# Patient Record
Sex: FEMALE | Race: WHITE | NOT HISPANIC OR LATINO | Employment: UNEMPLOYED | URBAN - NONMETROPOLITAN AREA
[De-identification: names, ages, dates, MRNs, and addresses within clinical notes are randomized per-mention and may not be internally consistent; named-entity substitution may affect disease eponyms.]

---

## 2017-01-04 ENCOUNTER — OFFICE VISIT (OUTPATIENT)
Dept: PSYCHIATRY | Facility: CLINIC | Age: 37
End: 2017-01-04

## 2017-01-04 VITALS
HEART RATE: 93 BPM | SYSTOLIC BLOOD PRESSURE: 123 MMHG | HEIGHT: 66 IN | WEIGHT: 191.4 LBS | DIASTOLIC BLOOD PRESSURE: 74 MMHG | BODY MASS INDEX: 30.76 KG/M2

## 2017-01-04 DIAGNOSIS — Z79.899 LONG TERM USE OF DRUG: ICD-10-CM

## 2017-01-04 DIAGNOSIS — F40.01 PANIC DISORDER WITH AGORAPHOBIA: ICD-10-CM

## 2017-01-04 DIAGNOSIS — F33.2 MDD (MAJOR DEPRESSIVE DISORDER), RECURRENT SEVERE, WITHOUT PSYCHOSIS (HCC): ICD-10-CM

## 2017-01-04 DIAGNOSIS — F11.20 UNCOMPLICATED OPIOID DEPENDENCE (HCC): Primary | ICD-10-CM

## 2017-01-04 LAB
AMPHETAMINE CUT-OFF: ABNORMAL
BENZODIAZIPINE CUT-OFF: ABNORMAL
BUPRENORPHINE CUT-OFF: ABNORMAL
COCAINE CUT-OFF: ABNORMAL
EXTERNAL AMPHETAMINE SCREEN URINE: NEGATIVE
EXTERNAL BENZODIAZEPINE SCREEN URINE: NEGATIVE
EXTERNAL BUPRENORPHINE SCREEN URINE: POSITIVE
EXTERNAL COCAINE SCREEN URINE: NEGATIVE
EXTERNAL MDMA: NEGATIVE
EXTERNAL METHADONE SCREEN URINE: NEGATIVE
EXTERNAL METHAMPHETAMINE SCREEN URINE: NEGATIVE
EXTERNAL OPIATES SCREEN URINE: NEGATIVE
EXTERNAL OXYCODONE SCREEN URINE: NEGATIVE
EXTERNAL THC SCREEN URINE: NEGATIVE
MDMA CUT-OFF: ABNORMAL
METHADONE CUT-OFF: ABNORMAL
METHAMPHETAMINE CUT-OFF: ABNORMAL
OPIATES CUT-OFF: ABNORMAL
OXYCODONE CUT-OFF: ABNORMAL
THC CUT-OFF: ABNORMAL

## 2017-01-04 PROCEDURE — 99213 OFFICE O/P EST LOW 20 MIN: CPT | Performed by: PSYCHIATRY & NEUROLOGY

## 2017-01-04 NOTE — MR AVS SNAPSHOT
Adina Thomas   1/4/2017 9:30 AM   Office Visit    Dept Phone:  [phone]   Encounter #:  [id number]    Provider:  Miguelito Moreau MD   Department:  [facility]                Your Full Care Plan              Where to Get Your Medications      You can get these medications from any pharmacy     Bring a paper prescription for each of these medications     Buprenorphine HCl-Naloxone HCl 11.4-2.9 MG sublingual tablet            Your Updated Medication List          This list is accurate as of: 1/4/17 10:13 AM.  Always use your most recent med list.                atenolol 25 MG tablet   Commonly known as:  TENORMIN       Buprenorphine HCl-Naloxone HCl 11.4-2.9 MG sublingual tablet   Place 11.4 mg under the tongue Daily.       ibuprofen 600 MG tablet   Commonly known as:  ADVIL,MOTRIN       levothyroxine 25 MCG tablet   Commonly known as:  SYNTHROID, LEVOTHROID       omeprazole 40 MG capsule   Commonly known as:  priLOSEC       ondansetron 8 MG tablet   Commonly known as:  ZOFRAN       PARoxetine 10 MG tablet   Commonly known as:  PAXIL   Take 1 tablet by mouth Every Morning.       SUMAtriptan 25 MG tablet   Commonly known as:  IMITREX               We Performed the Following     [facility] Drug Screen       You Were Diagnosed With        Codes Comments    Uncomplicated opioid dependence    -  Primary ICD-10-CM: F11.20  ICD-9-CM: 304.00     Long term use of drug     ICD-10-CM: Z79.899  ICD-9-CM: V58.69     MDD (major depressive disorder), recurrent severe, without psychosis     ICD-10-CM: F33.2  ICD-9-CM: 296.33     Panic disorder with agoraphobia     ICD-10-CM: F40.01  ICD-9-CM: 300.21       Instructions     None    Patient Instructions History      Upcoming Appointments     Visit Type Date Time Department    MEDICINE CHECK 1/4/2017  9:30 AM [facility]    MEDICINE CHECK 2/1/2017 10:00 AM [facility]      MyChart Signup     [facility]  "[employer] allows you to send messages to your doctor, view your test results, renew your prescriptions, schedule appointments, and more. To sign up, go to [employer] and click on the Sign Up Now link in the New User? box. Enter your [employer] Activation Code exactly as it appears below along with the last four digits of your Social Security Number and your Date of Birth () to complete the sign-up process. If you do not sign up before the expiration date, you must request a new code.    [employer] Activation Code: [email or web address]  Expires: 2017 10:13 AM    If you have questions, you can email [email or web address] or call [phone] to talk to our [employer] staff. Remember, [employer] is NOT to be used for urgent needs. For medical emergencies, dial 911.               Other Info from Your Visit           Your Appointments     2017 10:00 AM EST   Medicine Check with Miguelito Moreau MD   [facility] (--)    [address] [place] [id number]   [phone]              Allergies     Codeine  GI Intolerance    Demerol [Meperidine]      Not an allergic rxn, but caused hypotension at very small dose    Morphine And Related  Rash    Nuts  Angioedema    Tongue swelling w/ pecan      Vital Signs     Blood Pressure Pulse Height Weight Body Mass Index       123/74 93 65.5\" (166.4 cm) 191 lb 6.4 oz (86.8 kg) 31.37 kg/m2       Problems and Diagnoses Noted     Mood problem    Narcotic dependence    -  Primary    Encounter for long-term (current) use of medications        Panic disorder with agoraphobia          Results     [facility] Drug Screen      Component    External Amphetamine Screen Urine    Negative    Amphetamine Cut-Off    1000ml    External Benzodiazepine Screen Urine    Negative    Benzodiazipine Cut-Off    300ml    External Cocaine Screen Urine    Negative    Cocaine Cut-Off    300ml    External THC Screen Urine    Negative    THC Cut-Off    50ml    " External Methadone Screen Urine    Negative    Methadone Cut-Off    300ml    External Methamphetamine Screen Urine    Negative    Methamphetamine Cut-Off    1000ml    External Oxycodone Screen Urine    Negative    Oxycodone Cut-Off    100ml    External Buprenorphine Screen Urine    Positive    Buprenorphine Cut-Off    10ml    External MDMA    Negative    MDMA Cut-Off    500ml    External Opiates Screen Urine    Negative    Opiates Cut-Off    300ml

## 2017-01-04 NOTE — PROGRESS NOTES
"  CC: f/u opiate dependence    HX: The patient reports doing well today.  She discussed stress over the holiday after her mother got upset with her for posting a picture on Facebook that her mother was not happy with.  Currently her mother was very hostile in her comments to Adina.  She has discussed this with family members trying to seek support from her  and her grandmother.  She denies any drug use or alcohol use since last appointment.  She continues going to NA meetings weekly.  She still going to therapy twice a month.  She reports her mood and anxiety overall been stable.  No suicidal thoughts.  Craving has also been manageable.    Craving 2/10  Depression rating 2/10  Anxiety rating 3/10  Appetite-good   Energy level-good  Sleep-fair    I have reviewed pt's allergies and current medications.   Comp care-appt: 12/8 and 12/29  SYD # [id number] reviewed, no concerns  UDS from 01/04/17 :+BUP    Review of Systems   Constitutional: Negative.    Cardiovascular: Negative.    Gastrointestinal: Negative.    Neurological: Negative.      Visit Vitals   • /74  Comment: Left Arm   • Pulse 93   • Ht 65.5\" (166.4 cm)   • Wt 191 lb 6.4 oz (86.8 kg)   • BMI 31.37 kg/m2       EXAM: Neatly, casually dressed, good hygeine. Gait and station stable. No psychomotor agitation/retardation. No motor tics Speech is normal rate, amount. Associations intact. Mood \"okay\" affect slightly anxious.. TC-goal directed.  Denies SI/HI/VH/AH. TP-linear.  Attention and concentration are fair. Memory is intact for recent and remote events. Insight-limited, judgement-limited      Encounter Diagnoses   Name Primary?   • Long term use of drug    • Uncomplicated opioid dependence Yes   • MDD (major depressive disorder), recurrent severe, without psychosis    • Panic disorder with agoraphobia        Current Outpatient Prescriptions   Medication Sig Dispense Refill   • atenolol (TENORMIN) 25 MG tablet 1/2 tab po qd     • Buprenorphine " HCl-Naloxone HCl 11.4-2.9 MG sublingual tablet Place 11.4 mg under the tongue Daily. 28 tablet 0   • ibuprofen (ADVIL,MOTRIN) 600 MG tablet Take 600 mg by mouth Every 6 (Six) Hours As Needed for mild pain (1-3).     • levothyroxine (SYNTHROID, LEVOTHROID) 25 MCG tablet Take 25 mcg by mouth Daily.     • omeprazole (PriLOSEC) 40 MG capsule Take 40 mg by mouth Daily.     • ondansetron (ZOFRAN) 8 MG tablet Take 8 mg by mouth Every 8 (Eight) Hours As Needed.     • PARoxetine (PAXIL) 10 MG tablet Take 1 tablet by mouth Every Morning. 30 tablet 2   • SUMAtriptan (IMITREX) 25 MG tablet        No current facility-administered medications for this visit.      Plan:  1.  Continue current medications  2.  Continue therapy and discussed other options if she needed since her old therapist is moving.  3.  Continue NA meetings  4.  Supportive therapy today regarding setting appropriate boundaries with family members  5.  Return to clinic one month      The risks, benefits, and side-effects were discussed with the patient.     TIME IN:9:47A  TIME OUT:10:11A

## 2017-02-01 ENCOUNTER — OFFICE VISIT (OUTPATIENT)
Dept: PSYCHIATRY | Facility: CLINIC | Age: 37
End: 2017-02-01

## 2017-02-01 VITALS
WEIGHT: 194.2 LBS | SYSTOLIC BLOOD PRESSURE: 123 MMHG | BODY MASS INDEX: 31.21 KG/M2 | DIASTOLIC BLOOD PRESSURE: 72 MMHG | HEART RATE: 95 BPM | HEIGHT: 66 IN

## 2017-02-01 DIAGNOSIS — F33.2 SEVERE EPISODE OF RECURRENT MAJOR DEPRESSIVE DISORDER, WITHOUT PSYCHOTIC FEATURES (HCC): ICD-10-CM

## 2017-02-01 DIAGNOSIS — F40.01 PANIC DISORDER WITH AGORAPHOBIA: ICD-10-CM

## 2017-02-01 DIAGNOSIS — F11.20 UNCOMPLICATED OPIOID DEPENDENCE (HCC): Primary | ICD-10-CM

## 2017-02-01 DIAGNOSIS — Z79.899 LONG TERM USE OF DRUG: ICD-10-CM

## 2017-02-01 LAB

## 2017-02-01 PROCEDURE — 99213 OFFICE O/P EST LOW 20 MIN: CPT | Performed by: PSYCHIATRY & NEUROLOGY

## 2017-02-01 RX ORDER — PAROXETINE 10 MG/1
10 TABLET, FILM COATED ORAL EVERY MORNING
Qty: 30 TABLET | Refills: 2 | Status: SHIPPED | OUTPATIENT
Start: 2017-02-01 | End: 2017-03-29 | Stop reason: SDUPTHER

## 2017-02-01 NOTE — PROGRESS NOTES
"  CC: f/u opiate dependence    HX:   The patient reports doing well today.  She reports her mood has been stable and overall there've been no major stresses in her life.  She reports craving has been very low.  She is transitioning to a new therapist after her therapist left and is waiting for an her next appointment.  She continues get NA meetings.  She denies any illicit or prescription drug use.  Still using tobacco.  No side effects from medication.  She was concerned that it took an hour and a half for zubsolv to dissolve    Last comp care appt: 1/6/17  Na meetings: 1/6,1/10, 1/12, 1/17, 1/20, 1/25, 1/30  Craving 1/10  Depression rating 2/10  Anxiety rating 3/10  Appetite-good   Energy level-fair  Sleep-fair    I have reviewed pt's allergies and current medications.     [facility] # [id number] reviewed, no concerns  UDS from 02/01/17 :+BUP    Review of Systems   Constitutional: Negative.    Cardiovascular: Negative.    Gastrointestinal: Negative.    Neurological: Negative.      Visit Vitals   • /72   • Pulse 95   • Ht 65.5\" (166.4 cm)   • Wt 194 lb 3.2 oz (88.1 kg)   • BMI 31.83 kg/m2       EXAM: Neatly, casually dressed, good hygeine. Gait and station stable. No psychomotor agitation/retardation. No motor tics Speech is normal rate, amount. Associations intact. Mood \"okay\" affect euthymic. TC-goal directed.  Denies SI/HI/VH/AH. TP-linear.  Attention and concentration are fair. Memory is intact for recent and remote events. Insight-limited, judgement-limited      Encounter Diagnoses   Name Primary?   • Long term use of drug    • Uncomplicated opioid dependence Yes   • Severe episode of recurrent major depressive disorder, without psychotic features    • Panic disorder with agoraphobia        Current Outpatient Prescriptions   Medication Sig Dispense Refill   • atenolol (TENORMIN) 25 MG tablet 1/2 tab po qd     • Buprenorphine HCl-Naloxone HCl 11.4-2.9 MG sublingual tablet Place 11.4 mg under the tongue Daily. " 28 tablet 0   • ibuprofen (ADVIL,MOTRIN) 600 MG tablet Take 600 mg by mouth Every 6 (Six) Hours As Needed for mild pain (1-3).     • levothyroxine (SYNTHROID, LEVOTHROID) 25 MCG tablet Take 25 mcg by mouth Daily.     • omeprazole (PriLOSEC) 40 MG capsule Take 40 mg by mouth Daily.     • ondansetron (ZOFRAN) 8 MG tablet Take 8 mg by mouth Every 8 (Eight) Hours As Needed.     • PARoxetine (PAXIL) 10 MG tablet Take 1 tablet by mouth Every Morning. 30 tablet 2   • SUMAtriptan (IMITREX) 25 MG tablet        No current facility-administered medications for this visit.    Plan:  1. Continue current medications  2. Continue na meetings  3. Start with a new therapist through [facility].  4. RTC 28 days.   5.  Recommended that she drink water or eat a mint prior to taking the medication as this may help dissolve it.    The risks, benefits, and side-effects were discussed with the patient.     TIME IN:10:10A  TIME OUT:10:31A

## 2017-02-27 ENCOUNTER — OFFICE VISIT (OUTPATIENT)
Dept: PSYCHIATRY | Facility: CLINIC | Age: 37
End: 2017-02-27

## 2017-02-27 VITALS
HEART RATE: 91 BPM | HEIGHT: 65 IN | DIASTOLIC BLOOD PRESSURE: 79 MMHG | WEIGHT: 193 LBS | BODY MASS INDEX: 32.15 KG/M2 | SYSTOLIC BLOOD PRESSURE: 118 MMHG

## 2017-02-27 DIAGNOSIS — F33.2 MDD (MAJOR DEPRESSIVE DISORDER), RECURRENT SEVERE, WITHOUT PSYCHOSIS (HCC): Primary | ICD-10-CM

## 2017-02-27 DIAGNOSIS — F11.20 UNCOMPLICATED OPIOID DEPENDENCE (HCC): ICD-10-CM

## 2017-02-27 PROCEDURE — 99213 OFFICE O/P EST LOW 20 MIN: CPT | Performed by: PSYCHIATRY & NEUROLOGY
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
## 2017-03-29 ENCOUNTER — OFFICE VISIT (OUTPATIENT)
Dept: PSYCHIATRY | Facility: CLINIC | Age: 37
End: 2017-03-29

## 2017-03-29 VITALS
WEIGHT: 191 LBS | DIASTOLIC BLOOD PRESSURE: 74 MMHG | SYSTOLIC BLOOD PRESSURE: 117 MMHG | HEART RATE: 89 BPM | HEIGHT: 65 IN | BODY MASS INDEX: 31.82 KG/M2

## 2017-03-29 DIAGNOSIS — Z79.899 LONG TERM USE OF DRUG: ICD-10-CM

## 2017-03-29 DIAGNOSIS — F11.20 UNCOMPLICATED OPIOID DEPENDENCE (HCC): Primary | ICD-10-CM

## 2017-03-29 DIAGNOSIS — F33.2 SEVERE EPISODE OF RECURRENT MAJOR DEPRESSIVE DISORDER, WITHOUT PSYCHOTIC FEATURES (HCC): ICD-10-CM

## 2017-03-29 PROCEDURE — 99213 OFFICE O/P EST LOW 20 MIN: CPT | Performed by: PSYCHIATRY & NEUROLOGY

## 2017-03-29 RX ORDER — PAROXETINE 10 MG/1
10 TABLET, FILM COATED ORAL EVERY MORNING
Qty: 30 TABLET | Refills: 2 | Status: SHIPPED | OUTPATIENT
Start: 2017-03-29 | End: 2017-05-03 | Stop reason: SDUPTHER

## 2017-03-29 RX ORDER — ATENOLOL 25 MG/1
12.5 TABLET ORAL DAILY
COMMUNITY

## 2017-03-29 RX ORDER — OMEPRAZOLE 20 MG/1
CAPSULE, DELAYED RELEASE ORAL DAILY
Refills: 2 | COMMUNITY
Start: 2017-03-22 | End: 2017-03-29 | Stop reason: SDUPTHER
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
## 2017-04-10 ENCOUNTER — TELEPHONE (OUTPATIENT)
Dept: PSYCHIATRY | Facility: CLINIC | Age: 37
End: 2017-04-10

## 2017-04-10 NOTE — TELEPHONE ENCOUNTER
Adina's next appt isn't until 5/3/17 and she will be short a week on her medication.  She wasn't sure if you would call it in or what she needed to do.

## 2017-04-11 NOTE — TELEPHONE ENCOUNTER
Called in Buprenorphine HCL-Naloxone 11.4-2.9mg #7 with no refills to [facility] and spoke to Noah.

## 2017-05-01 ENCOUNTER — TELEPHONE (OUTPATIENT)
Dept: PSYCHIATRY | Facility: CLINIC | Age: 37
End: 2017-05-01

## 2017-05-02 ENCOUNTER — TELEPHONE (OUTPATIENT)
Dept: PSYCHIATRY | Facility: CLINIC | Age: 37
End: 2017-05-02

## 2017-05-03 ENCOUNTER — OFFICE VISIT (OUTPATIENT)
Dept: PSYCHIATRY | Facility: CLINIC | Age: 37
End: 2017-05-03

## 2017-05-03 VITALS
BODY MASS INDEX: 30.99 KG/M2 | SYSTOLIC BLOOD PRESSURE: 120 MMHG | HEART RATE: 104 BPM | DIASTOLIC BLOOD PRESSURE: 82 MMHG | WEIGHT: 186 LBS | HEIGHT: 65 IN

## 2017-05-03 DIAGNOSIS — Z79.899 LONG TERM USE OF DRUG: ICD-10-CM

## 2017-05-03 DIAGNOSIS — F41.0 PANIC DISORDER WITHOUT AGORAPHOBIA: ICD-10-CM

## 2017-05-03 DIAGNOSIS — F33.2 SEVERE EPISODE OF RECURRENT MAJOR DEPRESSIVE DISORDER, WITHOUT PSYCHOTIC FEATURES (HCC): ICD-10-CM

## 2017-05-03 DIAGNOSIS — F11.20 UNCOMPLICATED OPIOID DEPENDENCE (HCC): Primary | ICD-10-CM

## 2017-05-03 PROCEDURE — 99213 OFFICE O/P EST LOW 20 MIN: CPT | Performed by: PSYCHIATRY & NEUROLOGY

## 2017-05-03 RX ORDER — METOCLOPRAMIDE 10 MG/1
TABLET ORAL
Refills: 2 | COMMUNITY
Start: 2017-04-26

## 2017-05-03 RX ORDER — OMEPRAZOLE 20 MG/1
CAPSULE, DELAYED RELEASE ORAL
Refills: 2 | COMMUNITY
Start: 2017-04-19 | End: 2017-05-03 | Stop reason: SDUPTHER

## 2017-05-03 RX ORDER — PAROXETINE 10 MG/1
10 TABLET, FILM COATED ORAL EVERY MORNING
Qty: 30 TABLET | Refills: 2 | Status: SHIPPED | OUTPATIENT
Start: 2017-05-03 | End: 2017-06-07 | Stop reason: SDUPTHER

## 2017-05-03 RX ORDER — ONDANSETRON 8 MG/1
TABLET, ORALLY DISINTEGRATING ORAL
Refills: 0 | COMMUNITY
Start: 2017-04-26 | End: 2017-05-03 | Stop reason: SDUPTHER

## 2017-05-03 RX ORDER — LEVOTHYROXINE SODIUM 0.05 MG/1
TABLET ORAL
Refills: 2 | COMMUNITY
Start: 2017-04-26 | End: 2018-12-26

## 2017-06-07 ENCOUNTER — OFFICE VISIT (OUTPATIENT)
Dept: PSYCHIATRY | Facility: CLINIC | Age: 37
End: 2017-06-07

## 2017-06-07 VITALS
SYSTOLIC BLOOD PRESSURE: 135 MMHG | HEART RATE: 92 BPM | BODY MASS INDEX: 31.49 KG/M2 | DIASTOLIC BLOOD PRESSURE: 81 MMHG | HEIGHT: 65 IN | WEIGHT: 189 LBS

## 2017-06-07 DIAGNOSIS — F33.2 SEVERE EPISODE OF RECURRENT MAJOR DEPRESSIVE DISORDER, WITHOUT PSYCHOTIC FEATURES (HCC): ICD-10-CM

## 2017-06-07 DIAGNOSIS — Z79.899 LONG TERM USE OF DRUG: Primary | ICD-10-CM

## 2017-06-07 DIAGNOSIS — F11.20 UNCOMPLICATED OPIOID DEPENDENCE (HCC): ICD-10-CM

## 2017-06-07 DIAGNOSIS — F40.01 PANIC DISORDER WITH AGORAPHOBIA: ICD-10-CM

## 2017-06-07 LAB
AMPHETAMINE CUT-OFF: 1000
BENZODIAZIPINE CUT-OFF: 300
BUPRENORPHINE CUT-OFF: 10
COCAINE CUT-OFF: 300
EXTERNAL AMPHETAMINE SCREEN URINE: NEGATIVE
EXTERNAL BENZODIAZEPINE SCREEN URINE: NEGATIVE
EXTERNAL BUPRENORPHINE SCREEN URINE: POSITIVE
EXTERNAL COCAINE SCREEN URINE: NEGATIVE
EXTERNAL MDMA: NEGATIVE
EXTERNAL METHADONE SCREEN URINE: NEGATIVE
EXTERNAL METHAMPHETAMINE SCREEN URINE: NEGATIVE
EXTERNAL OPIATES SCREEN URINE: NEGATIVE
EXTERNAL OXYCODONE SCREEN URINE: NEGATIVE
EXTERNAL THC SCREEN URINE: NEGATIVE
MDMA CUT-OFF: 500
METHADONE CUT-OFF: 300
METHAMPHETAMINE CUT-OFF: 1000
OPIATES CUT-OFF: 300
OXYCODONE CUT-OFF: 100
THC CUT-OFF: 50

## 2017-06-07 PROCEDURE — 99213 OFFICE O/P EST LOW 20 MIN: CPT | Performed by: PSYCHIATRY & NEUROLOGY

## 2017-06-07 RX ORDER — PAROXETINE 10 MG/1
10 TABLET, FILM COATED ORAL EVERY MORNING
Qty: 30 TABLET | Refills: 2 | Status: SHIPPED | OUTPATIENT
Start: 2017-06-07 | End: 2017-07-31 | Stop reason: SDUPTHER

## 2017-06-07 NOTE — PROGRESS NOTES
"  CC: f/u opiate dependence, depression    HX:  The patient reports overall she is doing well.  She has been stressed due to their upcoming trip to [place].  They will be gone for 2 weeks.  She denies any illicit or prescription drug use.  Craving has been low.  She has been keeping up with NA meetings and therapy.  When I ask her about her recovery plans or on vacation, she did not have anything in place but would consider going to an NA meeting there and also plans on checking in with her sponsor.  Other than the stress of getting ready for the trip, she denies significant anxiety.  Mood is been stable.    Craving 2/10  Anxiety rating 3-4/10  Appetite-good   Energy level-good,  Sleep-good    I have reviewed pt's allergies and current medications.     SYD # [id number] reviewed, no concerns  UDS from 06/07/17 :+BUP  Therapy: 5/18,5/22, 6/6  NA meetings: 5/3,5/5,5/12,5/16,5/23,5/56,5/29  Review of Systems   Constitutional: Negative.    Cardiovascular: Negative.    Gastrointestinal: Negative.    Neurological: Negative.      /81  Pulse 92  Ht 65\" (165.1 cm)  Wt 189 lb (85.7 kg)  BMI 31.45 kg/m2    EXAM: Neatly, casually dressed, good hygeine. Gait and station stable. No psychomotor agitation/retardation. No motor tics Speech is normal rate, amount. Associations intact. Mood \"pretty good\" affect euthymic. TC-goal directed. Denies SI/HI/VH/AH. TP-linear. Attention and concentration are fair. Memory is intact for recent and remote events. Insight-limited, judgement-limited      Encounter Diagnoses   Name Primary?   • Long term use of drug Yes   • Uncomplicated opioid dependence    • Severe episode of recurrent major depressive disorder, without psychotic features    • Panic disorder with agoraphobia        Current Outpatient Prescriptions   Medication Sig Dispense Refill   • atenolol (TENORMIN) 25 MG tablet Take 12.5 mg by mouth Daily.     • Buprenorphine HCl-Naloxone HCl 11.4-2.9 MG sublingual tablet Place " 11.4 mg under the tongue Daily. 28 tablet 0   • ibuprofen (ADVIL,MOTRIN) 600 MG tablet Take 600 mg by mouth Every 6 (Six) Hours As Needed for mild pain (1-3).     • levothyroxine (SYNTHROID, LEVOTHROID) 50 MCG tablet   2   • metoclopramide (REGLAN) 10 MG tablet   2   • omeprazole (PriLOSEC) 40 MG capsule Take 40 mg by mouth Daily.     • ondansetron (ZOFRAN) 8 MG tablet Take 8 mg by mouth Every 8 (Eight) Hours As Needed.     • PARoxetine (PAXIL) 10 MG tablet Take 1 tablet by mouth Every Morning. 30 tablet 2     No current facility-administered medications for this visit.      Plan:  1. Continue current medications  2. Continue therapy and NA meetings  3. RTC 4 weeks      The risks, benefits, and side-effects were discussed with the patient.     TIME IN:5342H  TIME OUT:11:23A

## 2017-07-05 ENCOUNTER — OFFICE VISIT (OUTPATIENT)
Dept: PSYCHIATRY | Facility: CLINIC | Age: 37
End: 2017-07-05

## 2017-07-05 VITALS
WEIGHT: 183 LBS | SYSTOLIC BLOOD PRESSURE: 119 MMHG | DIASTOLIC BLOOD PRESSURE: 72 MMHG | HEIGHT: 65 IN | BODY MASS INDEX: 30.49 KG/M2 | HEART RATE: 92 BPM

## 2017-07-05 DIAGNOSIS — F11.20 UNCOMPLICATED OPIOID DEPENDENCE (HCC): Primary | ICD-10-CM

## 2017-07-05 DIAGNOSIS — Z79.899 LONG TERM USE OF DRUG: ICD-10-CM

## 2017-07-05 DIAGNOSIS — F33.2 MDD (MAJOR DEPRESSIVE DISORDER), RECURRENT SEVERE, WITHOUT PSYCHOSIS (HCC): ICD-10-CM

## 2017-07-05 PROCEDURE — 99213 OFFICE O/P EST LOW 20 MIN: CPT | Performed by: PSYCHIATRY & NEUROLOGY

## 2017-07-05 NOTE — PROGRESS NOTES
"OUTPATIENT PROGRESS NOTE    CC: f/u opiate dependence    HX:  The patient reports overall doing well.  She had a vacation with her family that went well, but the  back was very stressful.  She discussed having a panic attack after having been stuck in traffic for 3 hours.  She eventually was able to return to driving after dealing with the anxiety that was associated with nausea and vomiting.  She denies other changes in her mood.  Denies any illicit drug use.  She attempted to go to an NA meeting while on vacation but got lost.  She kept in contact with her sponsor nearly daily.    Craving 1/10  Depression rating 2-3/10  Anxiety rating 2-3/10   Appetite-good   Energy level-good  Sleep-good    I have reviewed pt's allergies and current medications.     UDS from 17 :+BUP  Therapy lo/6,  last appt.  NA lo/2, , , ,     Review of Systems   HENT: Positive for dental problem.         Dental pain of her upper teeth   Respiratory: Negative.    Cardiovascular: Negative.    Gastrointestinal: Negative.    Neurological: Negative.      /72  Pulse 92  Ht 65\" (165.1 cm)  Wt 183 lb (83 kg)  BMI 30.45 kg/m2    EXAM: Neatly, casually dressed, good hygeine. Gait and station stable. No psychomotor agitation/retardation. No motor tics Speech is talkative but not pressured. Associations intact. Mood \"okay\" affect euthymic.TC-goal directed.  Denies SI/HI/VH/AH. TP-linear.  Attention and concentration are fair. Memory is intact for recent and remote events. Insight-limited, judgement- limited      Encounter Diagnoses   Name Primary?   • Long term use of drug    • Uncomplicated opioid dependence Yes   • MDD (major depressive disorder), recurrent severe, without psychosis        Current Outpatient Prescriptions   Medication Sig Dispense Refill   • atenolol (TENORMIN) 25 MG tablet Take 12.5 mg by mouth Daily.     • Buprenorphine HCl-Naloxone HCl 11.4-2.9 MG sublingual tablet Place 11.4 mg " under the tongue Daily. 28 tablet 0   • ibuprofen (ADVIL,MOTRIN) 600 MG tablet Take 600 mg by mouth Every 6 (Six) Hours As Needed for mild pain (1-3).     • levothyroxine (SYNTHROID, LEVOTHROID) 50 MCG tablet   2   • metoclopramide (REGLAN) 10 MG tablet   2   • omeprazole (PriLOSEC) 40 MG capsule Take 40 mg by mouth Daily.     • ondansetron (ZOFRAN) 8 MG tablet Take 8 mg by mouth Every 8 (Eight) Hours As Needed.     • PARoxetine (PAXIL) 10 MG tablet Take 1 tablet by mouth Every Morning. 30 tablet 2     No current facility-administered medications for this visit.    Plan:  1. Continue current medication  2. Continue therapy and NA meetings  3.  Return to clinic 28 days    The risks, benefits, and side-effects were discussed with the patient.     TIME IN:1048AM  TIME OUT:11:05AM

## 2017-07-31 ENCOUNTER — OFFICE VISIT (OUTPATIENT)
Dept: PSYCHIATRY | Facility: CLINIC | Age: 37
End: 2017-07-31

## 2017-07-31 VITALS
SYSTOLIC BLOOD PRESSURE: 117 MMHG | WEIGHT: 188 LBS | BODY MASS INDEX: 31.32 KG/M2 | HEIGHT: 65 IN | DIASTOLIC BLOOD PRESSURE: 76 MMHG | HEART RATE: 87 BPM

## 2017-07-31 DIAGNOSIS — Z79.899 MEDICATION MANAGEMENT: ICD-10-CM

## 2017-07-31 DIAGNOSIS — F41.0 PANIC DISORDER WITHOUT AGORAPHOBIA: ICD-10-CM

## 2017-07-31 DIAGNOSIS — F33.2 SEVERE EPISODE OF RECURRENT MAJOR DEPRESSIVE DISORDER, WITHOUT PSYCHOTIC FEATURES (HCC): ICD-10-CM

## 2017-07-31 DIAGNOSIS — F11.20 UNCOMPLICATED OPIOID DEPENDENCE (HCC): Primary | ICD-10-CM

## 2017-07-31 PROCEDURE — 99213 OFFICE O/P EST LOW 20 MIN: CPT | Performed by: PSYCHIATRY & NEUROLOGY

## 2017-07-31 RX ORDER — PAROXETINE 10 MG/1
10 TABLET, FILM COATED ORAL EVERY MORNING
Qty: 30 TABLET | Refills: 2 | Status: SHIPPED | OUTPATIENT
Start: 2017-07-31 | End: 2017-09-14 | Stop reason: SDUPTHER

## 2017-07-31 RX ORDER — LEVOTHYROXINE SODIUM 0.03 MG/1
TABLET ORAL
COMMUNITY
Start: 2017-07-28 | End: 2017-07-31 | Stop reason: DRUGHIGH

## 2017-08-24 DIAGNOSIS — Z79.899 LONG TERM USE OF DRUG: Primary | ICD-10-CM

## 2017-09-14 ENCOUNTER — OFFICE VISIT (OUTPATIENT)
Dept: PSYCHIATRY | Facility: CLINIC | Age: 37
End: 2017-09-14

## 2017-09-14 VITALS
DIASTOLIC BLOOD PRESSURE: 71 MMHG | BODY MASS INDEX: 31.65 KG/M2 | HEART RATE: 88 BPM | HEIGHT: 65 IN | SYSTOLIC BLOOD PRESSURE: 113 MMHG | WEIGHT: 190 LBS

## 2017-09-14 DIAGNOSIS — F33.2 MDD (MAJOR DEPRESSIVE DISORDER), RECURRENT SEVERE, WITHOUT PSYCHOSIS (HCC): Primary | ICD-10-CM

## 2017-09-14 DIAGNOSIS — F11.20 UNCOMPLICATED OPIOID DEPENDENCE (HCC): ICD-10-CM

## 2017-09-14 DIAGNOSIS — Z79.899 MEDICATION MANAGEMENT: ICD-10-CM

## 2017-09-14 DIAGNOSIS — F41.0 PANIC DISORDER: ICD-10-CM

## 2017-09-14 PROCEDURE — 99213 OFFICE O/P EST LOW 20 MIN: CPT | Performed by: PSYCHIATRY & NEUROLOGY

## 2017-09-14 RX ORDER — LEVOTHYROXINE SODIUM 0.03 MG/1
TABLET ORAL
COMMUNITY
Start: 2017-08-24 | End: 2017-10-11 | Stop reason: DRUGHIGH

## 2017-09-14 RX ORDER — OMEPRAZOLE 20 MG/1
CAPSULE, DELAYED RELEASE ORAL
COMMUNITY
Start: 2017-08-24 | End: 2017-10-11 | Stop reason: DRUGHIGH

## 2017-09-14 RX ORDER — PAROXETINE 10 MG/1
10 TABLET, FILM COATED ORAL EVERY MORNING
Qty: 30 TABLET | Refills: 2 | Status: SHIPPED | OUTPATIENT
Start: 2017-09-14 | End: 2018-01-08 | Stop reason: SDUPTHER

## 2017-09-14 NOTE — PROGRESS NOTES
"Outpatient Psychiatric Progress Note    CC: f/u opiate dependence    HX: The patient reports doing okay today.  She has been stressed with her relationship with her  and finances lately.  She has been frustrated because her therapist left and she is having to see an interim therapist and is reluctant to talk about it with her.  The patient reports craving has been low.  She denies any illicit or prescription drug use.  Anxiety has been stable.    NA meetings 8/1, 8/4,, 8/7, 8/10,8/14, 8/17, 8/21, 8/24, 8/28  Therapy: 8/3/2017  UDS +BUP only    I have reviewed pt's allergies and current medications.     Review of Systems   HENT: Positive for congestion and sinus pressure.    Respiratory: Negative.    Cardiovascular: Negative.    Gastrointestinal: Negative.    Neurological: Positive for headaches.     /71  Pulse 88  Ht 65\" (165.1 cm)  Wt 190 lb (86.2 kg)  BMI 31.62 kg/m2    EXAM: Neatly, casually dressed, good hygeine. Gait and station stable. No psychomotor agitation/retardation. No motor tics Speech is normal rate, amount. Associations intact. Mood \"good\" affect euthymic, stable.. TC-goal directed.  Denies SI/HI/VH/AH. TP-linear.  Attention and concentration are fair. Memory is intact for recent and remote events. Insight-limited judgement-limited      Encounter Diagnoses   Name Primary?   • Medication management    • MDD (major depressive disorder), recurrent severe, without psychosis Yes   • Uncomplicated opioid dependence    • Panic disorder        Current Outpatient Prescriptions   Medication Sig Dispense Refill   • atenolol (TENORMIN) 25 MG tablet Take 12.5 mg by mouth Daily.     • Buprenorphine HCl-Naloxone HCl 11.4-2.9 MG sublingual tablet Place 11.4 mg under the tongue Daily. 24 tablet 0   • ibuprofen (ADVIL,MOTRIN) 600 MG tablet Take 600 mg by mouth Every 6 (Six) Hours As Needed for mild pain (1-3).     • levothyroxine (SYNTHROID, LEVOTHROID) 25 MCG tablet      • metoclopramide (REGLAN) 10 " MG tablet   2   • omeprazole (priLOSEC) 20 MG capsule      • ondansetron (ZOFRAN) 8 MG tablet Take 8 mg by mouth Every 8 (Eight) Hours As Needed.     • PARoxetine (PAXIL) 10 MG tablet Take 1 tablet by mouth Every Morning. 30 tablet 2   • levothyroxine (SYNTHROID, LEVOTHROID) 50 MCG tablet   2   • omeprazole (PriLOSEC) 40 MG capsule Take 40 mg by mouth Daily.       No current facility-administered medications for this visit.    Plan:  1. Continue buprenorphine at current dose  2.  Continue NA meetings and therapy  3.  Continue Paxil  4.  Return to clinic 4 weeks        The risks, benefits, and treatment alternatives were discussed with the patient.     TIME IN:127PM  TIME OUT:151PM

## 2017-10-11 ENCOUNTER — OFFICE VISIT (OUTPATIENT)
Dept: PSYCHIATRY | Facility: CLINIC | Age: 37
End: 2017-10-11

## 2017-10-11 ENCOUNTER — TELEPHONE (OUTPATIENT)
Dept: PSYCHIATRY | Facility: CLINIC | Age: 37
End: 2017-10-11

## 2017-10-11 VITALS
WEIGHT: 193 LBS | SYSTOLIC BLOOD PRESSURE: 122 MMHG | DIASTOLIC BLOOD PRESSURE: 80 MMHG | HEIGHT: 65 IN | BODY MASS INDEX: 32.15 KG/M2 | HEART RATE: 94 BPM

## 2017-10-11 DIAGNOSIS — Z79.899 MEDICATION MANAGEMENT: Primary | ICD-10-CM

## 2017-10-11 DIAGNOSIS — F33.2 MDD (MAJOR DEPRESSIVE DISORDER), RECURRENT SEVERE, WITHOUT PSYCHOSIS (HCC): ICD-10-CM

## 2017-10-11 DIAGNOSIS — F41.0 PANIC DISORDER: ICD-10-CM

## 2017-10-11 DIAGNOSIS — F11.20 UNCOMPLICATED OPIOID DEPENDENCE (HCC): ICD-10-CM

## 2017-10-11 PROCEDURE — 99214 OFFICE O/P EST MOD 30 MIN: CPT | Performed by: PSYCHIATRY & NEUROLOGY

## 2017-10-11 RX ORDER — OFLOXACIN 3 MG/ML
SOLUTION AURICULAR (OTIC)
COMMUNITY
Start: 2017-10-04 | End: 2017-11-08

## 2017-10-11 RX ORDER — AZITHROMYCIN 250 MG/1
TABLET, FILM COATED ORAL
COMMUNITY
Start: 2017-10-05 | End: 2017-11-08

## 2017-10-11 NOTE — PROGRESS NOTES
"Outpatient Psychiatric Progress Note    CC: f/u opiate dependency and    HX:  Patient reports that she has not been feeling well for the past couple of weeks due to severe ear infection.  She's been on antibiotics.  She also complained of fatigue, poor concentration, transposing letters in words she is trying to say.  She is unsure what this was about however when I assessed for sleep, she is only sleeping 4-5 hours a night usually disrupted.  She will sleep for an hour, wake up, then get on her phone, then return to sleep.  The patient denies any panic symptoms.  Denies feeling depressed or suicidal.  For substance use, she denies any craving.  She has been compliant with NA meetings in therapy, though she is scheduled to begin with the new therapist at [facility]  Anxiety rating 4/10   Sleep-restless, 4-5 total night  UDS: pending   SYD# [id number]  NA meetings:9/16, 9/19, 9/22m 9/25, 9/29, 10/3, 10/6, 10/9   Therapy: 9/15/17  I have reviewed pt's allergies and current medications.     Review of Systems   Constitutional: Positive for fatigue.   HENT: Positive for ear pain.    Respiratory: Negative.    Cardiovascular: Negative.    Gastrointestinal: Negative.    Genitourinary: Negative.    Neurological: Positive for speech difficulty and headaches.   Psychiatric/Behavioral: Negative.      /80  Pulse 94  Ht 65\" (165.1 cm)  Wt 193 lb (87.5 kg)  BMI 32.12 kg/m2    EXAM: Neatly, casually dressed, good hygeine. Gait and station stable. No psychomotor agitation/retardation. No motor tics Speech is normal rate, amount. Associations intact. Mood \"all right\" affect euthymic, stable.. TC-goal directed.  Denies SI/HI/VH/AH. TP-linear.  Attention and concentration are fair. Memory is intact for recent and remote events. Insight-fair, judgement-fair      Encounter Diagnoses   Name Primary?   • Medication management Yes   • MDD (major depressive disorder), recurrent severe, without psychosis    • Uncomplicated " opioid dependence        Current Outpatient Prescriptions   Medication Sig Dispense Refill   • atenolol (TENORMIN) 25 MG tablet Take 12.5 mg by mouth Daily.     • azithromycin (ZITHROMAX) 250 MG tablet      • Buprenorphine HCl-Naloxone HCl 11.4-2.9 MG sublingual tablet Place 11.4 mg under the tongue Daily. 28 tablet 0   • ibuprofen (ADVIL,MOTRIN) 600 MG tablet Take 600 mg by mouth Every 6 (Six) Hours As Needed for mild pain (1-3).     • levothyroxine (SYNTHROID, LEVOTHROID) 50 MCG tablet   2   • metoclopramide (REGLAN) 10 MG tablet   2   • ofloxacin (FLOXIN) 0.3 % otic solution      • omeprazole (PriLOSEC) 40 MG capsule Take 40 mg by mouth Daily.     • ondansetron (ZOFRAN) 8 MG tablet Take 8 mg by mouth Every 8 (Eight) Hours As Needed.     • PARoxetine (PAXIL) 10 MG tablet Take 1 tablet by mouth Every Morning. 30 tablet 2     No current facility-administered medications for this visit.    Plan:  1. Continue buprenorphine for opiate dependency  2.  Continue Paxil for panic  3.  Reviewed sleep hygiene concerns and also discussed other psychotherapeutic techniques such as guided imagery to help with anxious thoughts at night  4.  Return to clinic 28 days  5. F/u UDS today.         The risks, benefits, and treatment alternatives were discussed with the patient.     TIME IN:1015PM   TIME OUT:1043PM

## 2017-11-06 NOTE — TELEPHONE ENCOUNTER
Pt is coming in Thursday to drug  test but today is her last dose of Buprenorphine- Naloxone 11.4-2.9 sublingual tablets. Can you send in enough to do her until Thursday?

## 2017-11-08 ENCOUNTER — OFFICE VISIT (OUTPATIENT)
Dept: PSYCHIATRY | Facility: CLINIC | Age: 37
End: 2017-11-08

## 2017-11-08 VITALS
SYSTOLIC BLOOD PRESSURE: 133 MMHG | DIASTOLIC BLOOD PRESSURE: 83 MMHG | HEART RATE: 88 BPM | HEIGHT: 65 IN | WEIGHT: 188 LBS | BODY MASS INDEX: 31.32 KG/M2

## 2017-11-08 DIAGNOSIS — F41.0 PANIC DISORDER: ICD-10-CM

## 2017-11-08 DIAGNOSIS — Z79.899 MEDICATION MANAGEMENT: ICD-10-CM

## 2017-11-08 DIAGNOSIS — F11.20 UNCOMPLICATED OPIOID DEPENDENCE (HCC): Primary | ICD-10-CM

## 2017-11-08 PROCEDURE — 99213 OFFICE O/P EST LOW 20 MIN: CPT | Performed by: PSYCHIATRY & NEUROLOGY

## 2017-11-08 NOTE — PROGRESS NOTES
"OUTPATIENT PSYCHIATRIC PROGRESS NOTE    CC: f/u opioid dependence, panic disorder    HX:    She was seen for follow-up for opiate dependency.  Overall, she reports things are going well.  No illicit or prescription drug use.  She initially thought she would miss this appointment due to her grandmother's appointment in [place]; however her sister was able to take her grandmother to the appointment.  Adina discussed that her grandmother is very upset with her because of this.  Adina acknowledges that her grandmother can be demanding and entitled.  The patient otherwise reports her mood has been good.  No panic attacks.  Craving 2/10  Appetite-fair, Sleep-fair    I have reviewed pt's allergies and current medications.     [facility] # [id number] reviewed, no concerns  UDS from 11/08/17 : +BUP only  [facility] therapy: 10/18, 11/3  NA meetings: 10/12, 10/17, 10/20, 10/17, 10/24, 10/30, 11/6    Review of Systems   Constitutional: Negative.    Cardiovascular: Negative.    Gastrointestinal: Negative.    Neurological: Negative.      /83  Pulse 88  Ht 65\" (165.1 cm)  Wt 188 lb (85.3 kg)  BMI 31.28 kg/m2    EXAM: Neatly, casually dressed, good hygeine. Gait and station stable. No psychomotor agitation/retardation. No motor tics Speech is normal rate, amount. Associations intact. Mood \"good\" affect euthymic, stable. TC-goal directed.  Denies SI/HI/VH/AH. TP-linear.  Attention and concentration are fair. Memory is intact for recent and remote events. Insight-limited, judgement-limited      Encounter Diagnoses   Name Primary?   • Medication management    • Uncomplicated opioid dependence Yes   • Panic disorder        Current Outpatient Prescriptions   Medication Sig Dispense Refill   • atenolol (TENORMIN) 25 MG tablet Take 12.5 mg by mouth Daily.     • Buprenorphine HCl-Naloxone HCl 11.4-2.9 MG sublingual tablet Place 11.4 mg under the tongue Daily. 29 tablet 0   • CALTRATE 600+D 600-800 MG-UNIT tablet      • ibuprofen " (ADVIL,MOTRIN) 600 MG tablet Take 600 mg by mouth Every 6 (Six) Hours As Needed for mild pain (1-3).     • levothyroxine (SYNTHROID, LEVOTHROID) 50 MCG tablet   2   • metoclopramide (REGLAN) 10 MG tablet   2   • omeprazole (PriLOSEC) 40 MG capsule Take 40 mg by mouth Daily.     • ondansetron (ZOFRAN) 8 MG tablet Take 8 mg by mouth Every 8 (Eight) Hours As Needed.     • PARoxetine (PAXIL) 10 MG tablet Take 1 tablet by mouth Every Morning. 30 tablet 2     No current facility-administered medications for this visit.    Plan:  1. Continue zubzolv at current dose  2. Continue paxil for panic disorder  3. RTC 28 days      The risks, benefits, and side-effects were discussed with the patient.     TIME IN:1006AM  TIME OUT:1030AM

## 2017-12-04 DIAGNOSIS — Z79.899 MEDICATION MANAGEMENT: Primary | ICD-10-CM

## 2017-12-19 ENCOUNTER — TELEPHONE (OUTPATIENT)
Dept: PSYCHIATRY | Facility: CLINIC | Age: 37
End: 2017-12-19

## 2017-12-19 NOTE — TELEPHONE ENCOUNTER
Patient apt is not until 1/08 patient will be out of meds on 1/1 her next apt is not until 1/8 does she need to come in early or refills?

## 2017-12-20 NOTE — TELEPHONE ENCOUNTER
I called in Buprenorphine HCl-Naloxone HCl 11.4-2.9 MG sublingual tablet   1 week supply (#7) to [facility] on 12/20/17 @ 9:31

## 2018-01-08 ENCOUNTER — OFFICE VISIT (OUTPATIENT)
Dept: PSYCHIATRY | Facility: CLINIC | Age: 38
End: 2018-01-08

## 2018-01-08 VITALS
HEART RATE: 91 BPM | DIASTOLIC BLOOD PRESSURE: 78 MMHG | BODY MASS INDEX: 30.66 KG/M2 | SYSTOLIC BLOOD PRESSURE: 128 MMHG | WEIGHT: 184 LBS | HEIGHT: 65 IN

## 2018-01-08 DIAGNOSIS — Z79.899 MEDICATION MANAGEMENT: ICD-10-CM

## 2018-01-08 DIAGNOSIS — F11.20 UNCOMPLICATED OPIOID DEPENDENCE (HCC): Primary | ICD-10-CM

## 2018-01-08 DIAGNOSIS — F33.2 MDD (MAJOR DEPRESSIVE DISORDER), RECURRENT SEVERE, WITHOUT PSYCHOSIS (HCC): ICD-10-CM

## 2018-01-08 DIAGNOSIS — F41.0 PANIC DISORDER: ICD-10-CM

## 2018-01-08 PROCEDURE — 99214 OFFICE O/P EST MOD 30 MIN: CPT | Performed by: PSYCHIATRY & NEUROLOGY

## 2018-01-08 RX ORDER — MIRTAZAPINE 15 MG/1
15 TABLET, FILM COATED ORAL NIGHTLY PRN
Qty: 30 TABLET | Refills: 0 | Status: SHIPPED | OUTPATIENT
Start: 2018-01-08 | End: 2018-02-28

## 2018-01-08 RX ORDER — PAROXETINE 10 MG/1
10 TABLET, FILM COATED ORAL EVERY MORNING
Qty: 30 TABLET | Refills: 2 | Status: SHIPPED | OUTPATIENT
Start: 2018-01-08 | End: 2018-03-26 | Stop reason: SDUPTHER

## 2018-01-08 RX ORDER — ERGOCALCIFEROL 1.25 MG/1
50000 CAPSULE ORAL
COMMUNITY

## 2018-01-08 NOTE — PROGRESS NOTES
"Outpatient Psychiatric Progress Note    CC: f/u opioid dependence, major depression and panic disorder    HX: Adina was seen for follow-up today.  She reports overall, things have been going well.  During the holiday she had conflict with family but denies panic attacks and denies relapse.  She denies any illicit or prescription drug use.  Craving most days has been 2-3 out of 10 for opiates.  She reports her mood has been stable but sleep has been poor and her appetite is decreased.  She reports food does not seem to taste the same as it used to.  She denies feeling sick otherwise, but plans on seeing her PCP later today to discuss.  She also has a sleep study scheduled in the next month or 2.      Depression rating 2-3/10  Anxiety rating 2/10  Appetite-decreased   Energy level-fair to low  Sleep-poor    Updated social history: The patient has been encouraged to apply for a peer support position at [facility].  Her therapist has encouraged her to apply.    I have reviewed pt's allergies and current medications.     Review of Systems   Constitutional: Positive for appetite change and fatigue.   Respiratory: Negative.    Cardiovascular: Negative.    Gastrointestinal: Negative.    Neurological: Positive for headaches.   Psychiatric/Behavioral: Positive for sleep disturbance. Negative for suicidal ideas.     /78  Pulse 91  Ht 165 cm (64.96\")  Wt 83.5 kg (184 lb)  BMI 30.66 kg/m2    EXAM: Neatly, casually dressed, good hygeine. Gait and station stable. No psychomotor agitation/retardation. No motor tics Speech is normal rate, amount. Associations intact. Mood \"okay\" affect euthymic, stable.. TC-goal directed.  Denies SI/HI/VH/AH. TP-linear.  Attention and concentration are fair. Memory is intact for recent and remote events. Insight-limited, judgement-limited      Encounter Diagnoses   Name Primary?   • Medication management    • MDD (major depressive disorder), recurrent severe, without psychosis  "   • Uncomplicated opioid dependence Yes   • Panic disorder        Current Outpatient Prescriptions   Medication Sig Dispense Refill   • atenolol (TENORMIN) 25 MG tablet Take 12.5 mg by mouth Daily.     • Buprenorphine HCl-Naloxone HCl 11.4-2.9 MG sublingual tablet Place 11.4 mg under the tongue Daily. 28 tablet 0   • ibuprofen (ADVIL,MOTRIN) 600 MG tablet Take 600 mg by mouth Every 6 (Six) Hours As Needed for mild pain (1-3).     • levothyroxine (SYNTHROID, LEVOTHROID) 50 MCG tablet   2   • metoclopramide (REGLAN) 10 MG tablet   2   • omeprazole (PriLOSEC) 40 MG capsule Take 40 mg by mouth Daily.     • ondansetron (ZOFRAN) 8 MG tablet Take 8 mg by mouth Every 8 (Eight) Hours As Needed.     • PARoxetine (PAXIL) 10 MG tablet Take 1 tablet by mouth Every Morning. 30 tablet 2   • vitamin D (ERGOCALCIFEROL) 46453 units capsule capsule Take 50,000 Units by mouth Every 7 (Seven) Days.     • CALTRATE 600+D 600-800 MG-UNIT tablet      • mirtazapine (REMERON) 15 MG tablet Take 1 tablet by mouth At Night As Needed (insomnia). 30 tablet 0     No current facility-administered medications for this visit.    Plan:  1. Continue zubsolve for opiate dependency  2.  Continue Paxil for panic disorder  3.  Restart Remeron 7.5-50 mg nightly as needed for sleep  4.  Continue therapy at [facility].  If the patient presented as this, we will have to discuss getting therapy through another organization if possible.  5.  Return to clinic 28 days          TIME IN:951  TIME OUT:1020A

## 2018-02-02 ENCOUNTER — OFFICE VISIT (OUTPATIENT)
Dept: PSYCHIATRY | Facility: CLINIC | Age: 38
End: 2018-02-02

## 2018-02-02 VITALS
BODY MASS INDEX: 30.66 KG/M2 | DIASTOLIC BLOOD PRESSURE: 80 MMHG | HEIGHT: 65 IN | HEART RATE: 99 BPM | WEIGHT: 184 LBS | SYSTOLIC BLOOD PRESSURE: 132 MMHG

## 2018-02-02 DIAGNOSIS — Z79.899 MEDICATION MANAGEMENT: Primary | ICD-10-CM

## 2018-02-02 LAB
AMPHETAMINE CUT-OFF: 1000
BENZODIAZIPINE CUT-OFF: 300
BUPRENORPHINE CUT-OFF: 10
COCAINE CUT-OFF: 300
EXTERNAL AMPHETAMINE SCREEN URINE: NEGATIVE
EXTERNAL BENZODIAZEPINE SCREEN URINE: NEGATIVE
EXTERNAL BUPRENORPHINE SCREEN URINE: NEGATIVE
EXTERNAL COCAINE SCREEN URINE: NEGATIVE
EXTERNAL MDMA: NEGATIVE
EXTERNAL METHADONE SCREEN URINE: NEGATIVE
EXTERNAL METHAMPHETAMINE SCREEN URINE: POSITIVE
EXTERNAL OPIATES SCREEN URINE: NEGATIVE
EXTERNAL OXYCODONE SCREEN URINE: NEGATIVE
EXTERNAL THC SCREEN URINE: NEGATIVE
MDMA CUT-OFF: 500
METHADONE CUT-OFF: 300
METHAMPHETAMINE CUT-OFF: 1000
OPIATES CUT-OFF: 300
OXYCODONE CUT-OFF: 100
THC CUT-OFF: 50

## 2018-02-02 RX ORDER — MECLIZINE HCL 25 MG/1
TABLET, CHEWABLE ORAL AS NEEDED
Refills: 0 | COMMUNITY
Start: 2018-01-26 | End: 2019-09-17

## 2018-02-28 ENCOUNTER — OFFICE VISIT (OUTPATIENT)
Dept: PSYCHIATRY | Facility: CLINIC | Age: 38
End: 2018-02-28

## 2018-02-28 VITALS
DIASTOLIC BLOOD PRESSURE: 79 MMHG | HEART RATE: 91 BPM | BODY MASS INDEX: 29.82 KG/M2 | HEIGHT: 65 IN | SYSTOLIC BLOOD PRESSURE: 122 MMHG | WEIGHT: 179 LBS

## 2018-02-28 DIAGNOSIS — F33.2 MDD (MAJOR DEPRESSIVE DISORDER), RECURRENT SEVERE, WITHOUT PSYCHOSIS (HCC): ICD-10-CM

## 2018-02-28 DIAGNOSIS — F41.0 PANIC DISORDER: ICD-10-CM

## 2018-02-28 DIAGNOSIS — F11.20 UNCOMPLICATED OPIOID DEPENDENCE (HCC): Primary | ICD-10-CM

## 2018-02-28 DIAGNOSIS — Z79.899 MEDICATION MANAGEMENT: ICD-10-CM

## 2018-02-28 PROCEDURE — 99214 OFFICE O/P EST MOD 30 MIN: CPT | Performed by: PSYCHIATRY & NEUROLOGY

## 2018-02-28 NOTE — PROGRESS NOTES
"Outpatient Psychiatric Progress Note    CC: f/u opioid dependence, major depression and panic disorder    HX: Adina was seen for follow-up today.  The patient reports that she's been very stressed lately due to multiple issues including her grandmother's health issues.  They are unsure what is going on and her family is planning on taking her to the [place] in the next couple of days.  The patient herself also has some medical issues that she is anxious about including an area of skin cancer and also experiencing vertigo.  Her PCP diagnosed benign positional vertigo.  The patient also notes that her blood pressure has stayed to low to take Remeron at nighttime.  She was instructed to still take atenolol.  She finds herself getting tired with low blood pressure in the afternoons.  I reviewed her medications and the patient is consistently taking Reglan about 3-4 due to acid reflux.  She reports that she also finds other issues with talking at times and her tongue will not allow her to form the word she is wanting.      Depression rating 2-3/10  Anxiety rating 3-4/10  Appetite-decreased   Energy level-fair to low  Sleep-poor  1/5,1/24, 2/24 2/2,2/6,2/9,2/13,2/16,2/20,2/23    I have reviewed pt's allergies and current medications.     Review of Systems   Constitutional: Negative for appetite change and fatigue.   HENT: Positive for dental problem.    Respiratory: Positive for shortness of breath.    Cardiovascular: Positive for palpitations.   Gastrointestinal: Negative.    Musculoskeletal: Negative.    Neurological: Positive for dizziness. Negative for headaches.   Psychiatric/Behavioral: Negative for dysphoric mood, sleep disturbance and suicidal ideas.     /79  Pulse 91  Ht 165 cm (64.96\")  Wt 81.2 kg (179 lb)  BMI 29.82 kg/m2    MENTAL STATUS EXAM:  Appearance:Neatly, casually dressed, good hygeine.   Cooperation:Cooperative  Orientation: Ox4  Gait and station stable.   Psychomotor: No " "psychomotor agitation/retardation, No EPS, No motor tics  Speech-normal rate, amount.  Mood \"I'm okay--I'm alright\"   Affect-congruent/appropriate.  Thought Content-goal directed, no delusional material present  Thought process-linear, organized.  Suicidality: No SI  Homicidality: No HI  Perception: No AH/VH  Memory is intact for recent and remote events  Concentration: good  Impulse control-good  Insight- limited  Judgement- limited        Encounter Diagnoses   Name Primary?   • Medication management    • MDD (major depressive disorder), recurrent severe, without psychosis    • Uncomplicated opioid dependence Yes   • Panic disorder        Current Outpatient Prescriptions   Medication Sig Dispense Refill   • atenolol (TENORMIN) 25 MG tablet Take 12.5 mg by mouth Daily.     • Buprenorphine HCl-Naloxone HCl 11.4-2.9 MG sublingual tablet Place 11.4 mg under the tongue Daily. 28 tablet 0   • CALTRATE 600+D 600-800 MG-UNIT tablet      • ibuprofen (ADVIL,MOTRIN) 600 MG tablet Take 600 mg by mouth Every 6 (Six) Hours As Needed for mild pain (1-3).     • levothyroxine (SYNTHROID, LEVOTHROID) 50 MCG tablet   2   • metoclopramide (REGLAN) 10 MG tablet   2   • omeprazole (PriLOSEC) 40 MG capsule Take 40 mg by mouth Daily.     • ondansetron (ZOFRAN) 8 MG tablet Take 8 mg by mouth Every 8 (Eight) Hours As Needed.     • PARoxetine (PAXIL) 10 MG tablet Take 1 tablet by mouth Every Morning. 30 tablet 2   • TRAVEL SICKNESS 25 MG chewable tablet chewable tablet As Needed.  0   • vitamin D (ERGOCALCIFEROL) 78078 units capsule capsule Take 50,000 Units by mouth Every 7 (Seven) Days.       No current facility-administered medications for this visit.    Plan:  1. Continue zubsolve for opiate dependency  2.  Continue Paxil for panic disorder  3.  [facility]   4.  Return to clinic 28 days  5. I also recommended that she stop Reglan due to concern for possible EPS as well as hypertension and if she continues to " have GI symptoms to requested GI referral from her PCP    TIME IN:952  TIME OUT:4335F

## 2018-03-07 ENCOUNTER — TELEPHONE (OUTPATIENT)
Dept: PSYCHIATRY | Facility: CLINIC | Age: 38
End: 2018-03-07

## 2018-03-07 NOTE — TELEPHONE ENCOUNTER
Patient called stating she forgot to ask you the other day that she is going to be having oral surgery and will be put to sleep and was concern about it interfering with her Zubsolv states that mention about giving her Nitrous Oxide. Please advise

## 2018-03-20 ENCOUNTER — TELEPHONE (OUTPATIENT)
Dept: PSYCHIATRY | Facility: CLINIC | Age: 38
End: 2018-03-20

## 2018-03-20 NOTE — TELEPHONE ENCOUNTER
Adina is wondering if you could see her on Monday 3/26. She is taking a family member to get a stress test who is in Congestive heart failure and will not be able to make her appointment on 3/28. She says that Wednesdays are hard on her and said you mentioned seeing her on Mondays from here on out? Please advise.     Patient is calling back 3/21 for a response. Please advise.

## 2018-03-26 ENCOUNTER — OFFICE VISIT (OUTPATIENT)
Dept: PSYCHIATRY | Facility: CLINIC | Age: 38
End: 2018-03-26

## 2018-03-26 VITALS
WEIGHT: 178 LBS | HEART RATE: 94 BPM | BODY MASS INDEX: 29.66 KG/M2 | HEIGHT: 65 IN | SYSTOLIC BLOOD PRESSURE: 118 MMHG | DIASTOLIC BLOOD PRESSURE: 71 MMHG

## 2018-03-26 DIAGNOSIS — F41.0 PANIC DISORDER: ICD-10-CM

## 2018-03-26 DIAGNOSIS — F33.1 MODERATE EPISODE OF RECURRENT MAJOR DEPRESSIVE DISORDER (HCC): ICD-10-CM

## 2018-03-26 DIAGNOSIS — Z79.899 MEDICATION MANAGEMENT: ICD-10-CM

## 2018-03-26 DIAGNOSIS — F11.20 UNCOMPLICATED OPIOID DEPENDENCE (HCC): Primary | ICD-10-CM

## 2018-03-26 PROCEDURE — 99214 OFFICE O/P EST MOD 30 MIN: CPT | Performed by: PSYCHIATRY & NEUROLOGY

## 2018-03-26 RX ORDER — PAROXETINE 10 MG/1
10 TABLET, FILM COATED ORAL EVERY MORNING
Qty: 30 TABLET | Refills: 2 | Status: SHIPPED | OUTPATIENT
Start: 2018-03-26 | End: 2018-04-30 | Stop reason: SDUPTHER

## 2018-03-26 NOTE — PROGRESS NOTES
Outpatient Psychiatric Progress Note    CC: Follow-up for opioid dependence, depression, panic disorder    HX: She was seen for follow-up for opioid dependence and depression.  She reports craving continues to be stable at a 2-3 out of 10.  She denies any illicit or prescription drug use.  She continues to get a meetings and therapy.  She reports her mood has been stable but has been stressed by several family issues including her grandmother's physical illness which is still undiagnosed, a cancerous lesion on her own face, and her relationship with her daughter.  No panic attacks since last visit.    Updated social history:   The patient's daughter has been spending less time with her.  She believes this may be because she has tried to set limits particularly with her phone and the patient's daughter subsequently has preferred to stay at her grandmother's.     NA Meetings 3/12, 3/15, 3/19, 3/22, 3/24  Therapy appt 3/23/18    I have reviewed pt's allergies and current medications.   Current Outpatient Prescriptions   Medication Sig Dispense Refill   • atenolol (TENORMIN) 25 MG tablet Take 12.5 mg by mouth Daily.     • Buprenorphine HCl-Naloxone HCl 11.4-2.9 MG sublingual tablet Place 11.4 mg under the tongue Daily. 28 tablet 0   • CALTRATE 600+D 600-800 MG-UNIT tablet      • ibuprofen (ADVIL,MOTRIN) 600 MG tablet Take 600 mg by mouth Every 6 (Six) Hours As Needed for mild pain (1-3).     • levothyroxine (SYNTHROID, LEVOTHROID) 50 MCG tablet   2   • metoclopramide (REGLAN) 10 MG tablet   2   • omeprazole (PriLOSEC) 40 MG capsule Take 40 mg by mouth Daily.     • ondansetron (ZOFRAN) 8 MG tablet Take 8 mg by mouth Every 8 (Eight) Hours As Needed.     • PARoxetine (PAXIL) 10 MG tablet Take 1 tablet by mouth Every Morning. 30 tablet 2   • TRAVEL SICKNESS 25 MG chewable tablet chewable tablet As Needed.  0   • vitamin D (ERGOCALCIFEROL) 22261 units capsule capsule Take 50,000 Units by mouth Every 7 (Seven) Days.       No  "current facility-administered medications for this visit.        Allergies   Allergen Reactions   • Codeine GI Intolerance   • Demerol [Meperidine]      Not an allergic rxn, but caused hypotension at very small dose   • Prednisone Unknown (See Comments)     unknown   • Morphine And Related Rash   • Nuts Angioedema     Tongue swelling w/ pecan     Review of Systems   Constitutional: Negative.    Respiratory: Negative.    Cardiovascular: Negative.    Gastrointestinal: Negative.    Genitourinary: Negative.    Musculoskeletal: Negative.    Neurological: Positive for headaches.   Psychiatric/Behavioral: The patient is nervous/anxious.      /71   Pulse 94   Ht 165 cm (64.96\")   Wt 80.7 kg (178 lb)   BMI 29.66 kg/m²     MENTAL STATUS EXAM:  Appearance:Neatly, casually dressed, good hygeine.   Cooperation:Cooperative  Orientation: Ox4  Gait and station stable.   Psychomotor: No psychomotor agitation/retardation, No EPS, No motor tics  Speech-normal rate, amount.  Mood \"stressed\"   Affect- euthymic stable  Thought Content-goal directed, no delusional material present  Thought process-linear, organized.  Suicidality: No SI  Homicidality: No HI  Perception: No AH/VH  Memory is intact for recent and remote events  Concentration: good  Impulse control-good  Insight-fair   Judgement-fair    ASSESSMENT AND PLAN  Encounter Diagnoses   Name Primary?   • Medication management    • Uncomplicated opioid dependence Yes   • Moderate episode of recurrent major depressive disorder    • Panic disorder        Plan:  1.  Continue buprenorphine at current dose  2.  Continue Paxil at current dose for depression and panic disorder  3.  Supportive therapy today in her approach to her daughter and her grandmother.  I encouraged her to continue working on this in therapy with her therapist.  4.  Continue NA meetings and therapy at [facility] in [place]    TIME IN:1020  TIME OUT:0317    Miguelito Moreau MD  10:22 AM  "

## 2018-04-16 ENCOUNTER — TELEPHONE (OUTPATIENT)
Dept: PSYCHIATRY | Facility: CLINIC | Age: 38
End: 2018-04-16

## 2018-04-16 NOTE — TELEPHONE ENCOUNTER
Adina called and stated that she has an appt on 4/23/18 to have a biopsy done on her thyroid.  The doctor said he would prescribe her #1  Xanax 0.5mg for anxiety.  She wanted to make sure it was ok with you before taking it. The 23 rd is also her appt date here and I'm going to have to reschedule it. When would be the best time to r/s her appointment.  Please advise

## 2018-04-19 ENCOUNTER — PRIOR AUTHORIZATION (OUTPATIENT)
Dept: PSYCHIATRY | Facility: CLINIC | Age: 38
End: 2018-04-19

## 2018-04-19 NOTE — TELEPHONE ENCOUNTER
Patient has been informed her Zubsolv has been approved, I also contacted the pharmacy to make sure it would go through which it did.

## 2018-04-30 ENCOUNTER — OFFICE VISIT (OUTPATIENT)
Dept: PSYCHIATRY | Facility: CLINIC | Age: 38
End: 2018-04-30

## 2018-04-30 VITALS
BODY MASS INDEX: 28.99 KG/M2 | SYSTOLIC BLOOD PRESSURE: 121 MMHG | DIASTOLIC BLOOD PRESSURE: 69 MMHG | WEIGHT: 174 LBS | HEIGHT: 65 IN | HEART RATE: 89 BPM

## 2018-04-30 DIAGNOSIS — F33.1 MODERATE EPISODE OF RECURRENT MAJOR DEPRESSIVE DISORDER (HCC): ICD-10-CM

## 2018-04-30 DIAGNOSIS — Z79.899 MEDICATION MANAGEMENT: ICD-10-CM

## 2018-04-30 DIAGNOSIS — F11.20 UNCOMPLICATED OPIOID DEPENDENCE (HCC): Primary | ICD-10-CM

## 2018-04-30 DIAGNOSIS — F41.0 PANIC DISORDER: ICD-10-CM

## 2018-04-30 PROCEDURE — 99213 OFFICE O/P EST LOW 20 MIN: CPT | Performed by: PSYCHIATRY & NEUROLOGY

## 2018-04-30 RX ORDER — METOCLOPRAMIDE 5 MG/1
TABLET ORAL
COMMUNITY
Start: 2018-04-23 | End: 2018-04-30 | Stop reason: DRUGHIGH

## 2018-04-30 RX ORDER — PAROXETINE 10 MG/1
10 TABLET, FILM COATED ORAL EVERY MORNING
Qty: 30 TABLET | Refills: 2 | Status: SHIPPED | OUTPATIENT
Start: 2018-04-30 | End: 2018-08-13 | Stop reason: SDUPTHER

## 2018-05-22 ENCOUNTER — TELEPHONE (OUTPATIENT)
Dept: PSYCHIATRY | Facility: CLINIC | Age: 38
End: 2018-05-22

## 2018-05-22 NOTE — TELEPHONE ENCOUNTER
Patient did not get her next appointment until 07-02-18.  She will be leaving for vacation for 2 weeks.  Can you refill her Subutex for a quanity of 36?

## 2018-06-14 ENCOUNTER — TELEPHONE (OUTPATIENT)
Dept: PSYCHIATRY | Facility: CLINIC | Age: 38
End: 2018-06-14

## 2018-06-14 NOTE — TELEPHONE ENCOUNTER
Patient called the office stating that she is leaving for [place] Saturday for 2 weeks and that she wasn't for sure if it happened on her end or the Pharmacy side but she is going be 1 short on her Burprenorphine her next laura isn't till July 2nd . Please Advise

## 2018-06-14 NOTE — TELEPHONE ENCOUNTER
I sent in 3 additional tablets and told the pharmacy they could fill it early; however, he pharmacy may not be able to do this.  Otherwise she will have to manage with her current prescription.

## 2018-07-02 ENCOUNTER — OFFICE VISIT (OUTPATIENT)
Dept: PSYCHIATRY | Facility: CLINIC | Age: 38
End: 2018-07-02

## 2018-07-02 VITALS
BODY MASS INDEX: 28.66 KG/M2 | WEIGHT: 172 LBS | HEART RATE: 94 BPM | DIASTOLIC BLOOD PRESSURE: 80 MMHG | SYSTOLIC BLOOD PRESSURE: 126 MMHG | HEIGHT: 65 IN

## 2018-07-02 DIAGNOSIS — F11.20 UNCOMPLICATED OPIOID DEPENDENCE (HCC): ICD-10-CM

## 2018-07-02 DIAGNOSIS — F19.10 SUBSTANCE ABUSE (HCC): ICD-10-CM

## 2018-07-02 DIAGNOSIS — Z79.899 MEDICATION MANAGEMENT: Primary | ICD-10-CM

## 2018-07-02 DIAGNOSIS — F33.1 MODERATE EPISODE OF RECURRENT MAJOR DEPRESSIVE DISORDER (HCC): ICD-10-CM

## 2018-07-02 PROCEDURE — 99214 OFFICE O/P EST MOD 30 MIN: CPT | Performed by: NURSE PRACTITIONER

## 2018-07-02 NOTE — PROGRESS NOTES
"      Subjective   Adina Thomas is a 38 y.o. female is here today for medication management follow-up.    Chief Complaint:  \"Im doing OK\"    History of Present Illness:  Pt is her for medication follow up.  Dr Moreau is helping perform this interview.  Patient's care is being transferred from Dr. Moreau to myself.  Patient is doing well.  She rates her anxiety at 2-3 out of 10 on a 1-10 scale with 10 being worst.  She denies any depression.  Denies any suicidal thoughts, homicidal thoughts, or any AV hallucinations.  She is not having any panic attacks.  She is craving on a 2-3 level out of 10 with 10 being worse as well.  She states the current dose is adequate.  She is still having some trouble sleeping restlessly but is dealing with it.  She did have a recent sleep study which was negative for apnea.  She denies any current substance abuse denies any alcohol use and continues to use the cigarettes.  She has a narcotic anonymous meeting log which shows meetings consistently that she has attended.  A copy of this has been placed in the chart.  She is also continuing with therapy at [facility] with the most recent appointment May 1.  There have been several counselor changes there and she still desires to stay there as it is close to her house.Next appointment with [facility] is July 5 and this is [facility].  Patient had some episodes of palpitations in which her primary care physician is attempting to do a cardiac workup with a stress test and is pending approval for that.  Patient currently denies any palpitations or chest today.Body mass index is 28.66 kg/m².  No change in appetite since last visit.    The following portions of the patient's history were reviewed and updated as appropriate: allergies, current medications, past medical history, past social history, past surgical history and problem list.    Review of Systems   Constitutional: Negative for activity change, appetite change and " "fatigue.   HENT: Negative.    Eyes: Negative for visual disturbance.   Respiratory: Negative.    Cardiovascular: Negative.    Gastrointestinal: Negative for nausea.   Endocrine: Negative.    Genitourinary: Negative.    Musculoskeletal: Negative for arthralgias.   Skin: Negative.    Allergic/Immunologic: Negative.    Neurological: Negative for dizziness, seizures and headaches.   Hematological: Negative.    Psychiatric/Behavioral: Positive for sleep disturbance. Negative for agitation, behavioral problems, confusion, decreased concentration, dysphoric mood, hallucinations, self-injury and suicidal ideas. The patient is not nervous/anxious and is not hyperactive.        Objective   Physical Exam   Constitutional: She appears well-developed and well-nourished.   Psychiatric: She has a normal mood and affect. Her speech is normal and behavior is normal. Judgment and thought content normal. Cognition and memory are normal.   Vitals reviewed.    Blood pressure 126/80, pulse 94, height 165 cm (64.96\"), weight 78 kg (172 lb).    Medication List:   Current Outpatient Prescriptions   Medication Sig Dispense Refill   • atenolol (TENORMIN) 25 MG tablet Take 12.5 mg by mouth Daily.     • Buprenorphine HCl-Naloxone HCl 11.4-2.9 MG sublingual tablet Place 11.4 mg under the tongue Daily. 3 tablet 0   • CALTRATE 600+D 600-800 MG-UNIT tablet      • ibuprofen (ADVIL,MOTRIN) 600 MG tablet Take 600 mg by mouth Every 6 (Six) Hours As Needed for mild pain (1-3).     • levothyroxine (SYNTHROID, LEVOTHROID) 50 MCG tablet   2   • metoclopramide (REGLAN) 10 MG tablet   2   • omeprazole (PriLOSEC) 40 MG capsule Take 40 mg by mouth Daily.     • ondansetron (ZOFRAN) 8 MG tablet Take 8 mg by mouth Every 8 (Eight) Hours As Needed.     • PARoxetine (PAXIL) 10 MG tablet Take 1 tablet by mouth Every Morning. 30 tablet 2   • TRAVEL SICKNESS 25 MG chewable tablet chewable tablet As Needed.  0   • vitamin D (ERGOCALCIFEROL) 92660 units capsule capsule " Take 50,000 Units by mouth Every 7 (Seven) Days.       No current facility-administered medications for this visit.        Mental Status Exam:   Hygiene:   good  Cooperation:  Cooperative  Eye Contact:  Good  Psychomotor Behavior:  Appropriate  Affect:  Full range  Hopelessness: Denies  Speech:  Normal  Thought Process:  Goal directed  Thought Content:  Normal  Suicidal:  None  Homicidal:  None  Hallucinations:  None  Delusion:  None  Memory:  Intact  Orientation:  Person, Place, Time and Situation  Reliability:  good  Insight:  Good  Judgement:  Good  Impulse Control:  Fair  Physical/Medical Issues:  Yes hypothyroidism    Assessment/Plan   Problems Addressed this Visit     None      Visit Diagnoses     Medication management    -  Primary    Relevant Orders    [facility] Drug Screen    Uncomplicated opioid dependence        Moderate episode of recurrent major depressive disorder        Substance abuse            Functionality: pt having minimal impairment in important areas of daily functioning.  Prognosis:  good dependent on medication/follow up and treatment plan compliance.    Patient was interviewed with Dr. Moreau.  She is to continue her meetings and is well as her therapy through [facility].  Her UDS was reviewed today which was only positive for people orphaned.  Ruben is appropriate.  She denies having any questions.  When patient is due back in 4 weeks that Monday I'll be out of town and she needs to be seen on a Monday so I have went ahead and prescribed 3 weeks of medications with 1 refill in order to get her act here in 6 weeks.  Reviewed the risks, benefits, and side effects of the medications; patient acknowledged and verbally consented.  Patient is agreeable to call the [facility].  Patient is aware to call 911 or go to the nearest ER should begin having SI/HI.

## 2018-08-08 ENCOUNTER — TELEPHONE (OUTPATIENT)
Dept: PSYCHIATRY | Facility: CLINIC | Age: 38
End: 2018-08-08

## 2018-08-08 NOTE — TELEPHONE ENCOUNTER
Patient called and stated that she won't have enough Zubsolv. She will be out the day of her appt and she takes it first thing in the morning so she wanted to know if you could call her in one pill.

## 2018-08-08 NOTE — TELEPHONE ENCOUNTER
Called patient to confirm that she is still seeing a therapist at [facility] in [place]. She is seeing Kia there and her last visit was 08/02/18. Therapist is suggesting that patient has therapy every 2 weeks and patient agreed. Called [facility] and they verified the information in order for Shereen to remain writing the Zubsolv.

## 2018-08-13 ENCOUNTER — OFFICE VISIT (OUTPATIENT)
Dept: PSYCHIATRY | Facility: CLINIC | Age: 38
End: 2018-08-13

## 2018-08-13 VITALS
SYSTOLIC BLOOD PRESSURE: 113 MMHG | DIASTOLIC BLOOD PRESSURE: 77 MMHG | HEIGHT: 65 IN | HEART RATE: 91 BPM | WEIGHT: 167.6 LBS | BODY MASS INDEX: 27.92 KG/M2

## 2018-08-13 DIAGNOSIS — F11.20 UNCOMPLICATED OPIOID DEPENDENCE (HCC): Primary | ICD-10-CM

## 2018-08-13 DIAGNOSIS — F33.1 MODERATE EPISODE OF RECURRENT MAJOR DEPRESSIVE DISORDER (HCC): ICD-10-CM

## 2018-08-13 LAB
AMPHETAMINE CUT-OFF: 1000
BENZODIAZIPINE CUT-OFF: 300
BUPRENORPHINE CUT-OFF: 10
COCAINE CUT-OFF: 300
EXTERNAL AMPHETAMINE SCREEN URINE: NEGATIVE
EXTERNAL BENZODIAZEPINE SCREEN URINE: POSITIVE
EXTERNAL BUPRENORPHINE SCREEN URINE: POSITIVE
EXTERNAL COCAINE SCREEN URINE: NEGATIVE
EXTERNAL MDMA: NEGATIVE
EXTERNAL METHADONE SCREEN URINE: NEGATIVE
EXTERNAL METHAMPHETAMINE SCREEN URINE: NEGATIVE
EXTERNAL OPIATES SCREEN URINE: NEGATIVE
EXTERNAL OXYCODONE SCREEN URINE: NEGATIVE
EXTERNAL THC SCREEN URINE: NEGATIVE
MDMA CUT-OFF: 500
METHADONE CUT-OFF: 300
METHAMPHETAMINE CUT-OFF: 1000
OPIATES CUT-OFF: 300
OXYCODONE CUT-OFF: 100
THC CUT-OFF: 50

## 2018-08-13 PROCEDURE — 99214 OFFICE O/P EST MOD 30 MIN: CPT | Performed by: NURSE PRACTITIONER

## 2018-08-13 RX ORDER — ISOSORBIDE MONONITRATE 60 MG/1
60 TABLET, EXTENDED RELEASE ORAL DAILY
Refills: 5 | COMMUNITY
Start: 2018-07-17 | End: 2019-08-21 | Stop reason: ALTCHOICE

## 2018-08-13 RX ORDER — BUPRENORPHINE HYDROCHLORIDE AND NALOXONE HYDROCHLORIDE 11.4; 2.9 MG/1; MG/1
TABLET, ORALLY DISINTEGRATING SUBLINGUAL
Qty: 1 TABLET | Refills: 0 | OUTPATIENT
Start: 2018-08-13

## 2018-08-13 RX ORDER — PAROXETINE 10 MG/1
10 TABLET, FILM COATED ORAL EVERY MORNING
Qty: 30 TABLET | Refills: 2 | Status: SHIPPED | OUTPATIENT
Start: 2018-08-13 | End: 2018-10-08 | Stop reason: SDUPTHER

## 2018-08-13 NOTE — PROGRESS NOTES
"      Subjective   Adina Thomas is a 38 y.o. female is here today for medication management follow-up.    Chief Complaint: \"Still doing good\"    History of Present Illness:     Had to put cat that she has had over 10 years to sleep over the weekend.  This has caused increased anxiety.  Rates depression and anxiety both a 2/10 with 10 being worse.  No suicidal ideation, homicidal ideation, Sleeping well at least 3-4 hours a night. Has not been taking her remeron was afraid it could affect her blood pressure.  Body mass index is 28.27 kg/m². No appetite changes.  No new medical stressors.  She is not working at the present.  She is taking her daughter to [employer].  She has completed a stress test last week.  Rates opioid cravings as a 1-2 out of 10 mainly when upset. Things continue to be stable at home in that her  does not like the fact that she has to take the buprenorphine but he is accepthing of it.         The following portions of the patient's history were reviewed and updated as appropriate: allergies, current medications, past medical history, past social history, past surgical history and problem list.    Review of Systems   Constitutional: Negative for activity change, appetite change and fatigue.   HENT: Negative.    Eyes: Negative for visual disturbance.   Respiratory: Negative.    Cardiovascular: Negative.    Gastrointestinal: Negative for nausea.   Endocrine: Negative.    Genitourinary: Negative.    Musculoskeletal: Negative for arthralgias.   Skin: Negative.    Allergic/Immunologic: Negative.    Neurological: Negative for dizziness, seizures and headaches.   Hematological: Negative.    Psychiatric/Behavioral: Positive for sleep disturbance. Negative for agitation, behavioral problems, confusion, decreased concentration, dysphoric mood, hallucinations, self-injury and suicidal ideas. The patient is not nervous/anxious and is not hyperactive.        Objective   Physical Exam " "  Constitutional: She appears well-developed and well-nourished.   Psychiatric: She has a normal mood and affect. Her speech is normal and behavior is normal. Judgment and thought content normal. Cognition and memory are normal.   Vitals reviewed.    Blood pressure 113/77, pulse 91, height 164 cm (64.57\"), weight 76 kg (167 lb 9.6 oz).    Medication List:   Current Outpatient Prescriptions   Medication Sig Dispense Refill   • atenolol (TENORMIN) 25 MG tablet Take 12.5 mg by mouth Daily.     • Buprenorphine HCl-Naloxone HCl 11.4-2.9 MG sublingual tablet Place 11.4 mg under the tongue Daily. 28 tablet 0   • CALTRATE 600+D 600-800 MG-UNIT tablet      • ibuprofen (ADVIL,MOTRIN) 600 MG tablet Take 600 mg by mouth Every 6 (Six) Hours As Needed for mild pain (1-3).     • isosorbide mononitrate (IMDUR) 60 MG 24 hr tablet Take 60 mg by mouth Daily.  5   • levothyroxine (SYNTHROID, LEVOTHROID) 50 MCG tablet   2   • metoclopramide (REGLAN) 10 MG tablet   2   • omeprazole (PriLOSEC) 40 MG capsule Take 40 mg by mouth Daily.     • ondansetron (ZOFRAN) 8 MG tablet Take 8 mg by mouth Every 8 (Eight) Hours As Needed.     • PARoxetine (PAXIL) 10 MG tablet Take 1 tablet by mouth Every Morning. 30 tablet 2   • TRAVEL SICKNESS 25 MG chewable tablet chewable tablet As Needed.  0   • vitamin D (ERGOCALCIFEROL) 39935 units capsule capsule Take 50,000 Units by mouth Every 7 (Seven) Days.       No current facility-administered medications for this visit.        Mental Status Exam:   Hygiene:   good  Cooperation:  Cooperative  Eye Contact:  Good  Psychomotor Behavior:  Appropriate  Affect:  Full range  Hopelessness: Denies  Speech:  Normal  Thought Process:  Goal directed  Thought Content:  Normal  Suicidal:  None  Homicidal:  None  Hallucinations:  None  Delusion:  None  Memory:  Intact  Orientation:  Person, Place, Time and Situation  Reliability:  good  Insight:  Good  Judgement:  Good  Impulse Control:  Fair  Physical/Medical Issues:  Yes " hypothyroidism    Assessment/Plan   Problems Addressed this Visit     None      Visit Diagnoses     Uncomplicated opioid dependence (CMS/HCC)    -  Primary    Relevant Medications    Buprenorphine HCl-Naloxone HCl 11.4-2.9 MG sublingual tablet    Other Relevant Orders    [facility] Drug Screen (Completed)    Moderate episode of recurrent major depressive disorder (CMS/HCC)        Relevant Medications    PARoxetine (PAXIL) 10 MG tablet        Functionality: pt having minimal impairment in important areas of daily functioning.  Prognosis:  Guarded dependent on medication/follow up and treatment plan compliance.  Alexx reviewed and appropriate.    UDS reviewed. Positive for buprenorphine.        Patient's narcotic anonymous meeting long was scanned into Epic.  She also is continuing with appointments at [facility] for counseling.  She is same going to start does biweekly as there have been issues with a therapist being available.  We had a long discussion with eventual taper of the buprenorphine.  and she states that at some point in her life she does want to taper and wants to come off right now although she is not ready.  I have recommended that she restart the Remeron for sleep and she states she has enough of that at home.  Patient is agreeable to call the [facility].  Patient is aware to call 911 or go to the nearest ER should begin having SI/HI.

## 2018-09-06 ENCOUNTER — OFFICE VISIT (OUTPATIENT)
Dept: PSYCHIATRY | Facility: CLINIC | Age: 38
End: 2018-09-06

## 2018-09-06 VITALS
DIASTOLIC BLOOD PRESSURE: 76 MMHG | HEART RATE: 89 BPM | BODY MASS INDEX: 28.64 KG/M2 | WEIGHT: 169.8 LBS | SYSTOLIC BLOOD PRESSURE: 125 MMHG

## 2018-09-06 DIAGNOSIS — F11.20 UNCOMPLICATED OPIOID DEPENDENCE (HCC): Primary | ICD-10-CM

## 2018-09-06 DIAGNOSIS — F33.1 MODERATE EPISODE OF RECURRENT MAJOR DEPRESSIVE DISORDER (HCC): ICD-10-CM

## 2018-09-06 DIAGNOSIS — Z79.899 MEDICATION MANAGEMENT: ICD-10-CM

## 2018-09-06 PROCEDURE — 99214 OFFICE O/P EST MOD 30 MIN: CPT | Performed by: NURSE PRACTITIONER

## 2018-09-06 NOTE — PROGRESS NOTES
Subjective   Adina Thomas is a 38 y.o. female is here today for medication management follow-up.    Chief Complaint  History of Present Illness:   Pt is having a lot of chest pain and is scheduled for a heart cath in [place]. Pt is having some increased anxiety over this upcoming procedure.  She states if you take that out of the picture her anxiety is stable.  Rates depression a 1-2/10 with 10 being worse.  Denies suicidal thoughts, homicidal thoughts, or any A/V hallucinations.  Lives with her .  Her grandmother has current custody of her 15 year old daughter.  She states there are lots of issues regarding this causing family discord.  Pts mother is currently not speaking to her own mother over this.  Pt states that her grandmother has many political connections and she is controlling and it would be very hard on the child if they went to work.  Body mass index is 28.64 kg/m². Denies any appetite changes.  Denies any cravings.  Sleeping at least 8 hours a night.  Pt plans on going back to work part time at some point after the heart issues get resolved.  Her homework continues to be stable.       The following portions of the patient's history were reviewed and updated as appropriate: allergies, current medications, past medical history, past social history, past surgical history and problem list.    Review of Systems   Constitutional: Negative for activity change, appetite change and fatigue.   HENT: Negative.    Eyes: Negative for visual disturbance.   Respiratory: Negative.    Cardiovascular: Negative.    Gastrointestinal: Negative for nausea.   Endocrine: Negative.    Genitourinary: Negative.    Musculoskeletal: Negative for arthralgias.   Skin: Negative.    Allergic/Immunologic: Negative.    Neurological: Negative for dizziness, seizures and headaches.   Hematological: Negative.    Psychiatric/Behavioral: Positive for sleep disturbance. Negative for agitation, behavioral problems, confusion,  decreased concentration, dysphoric mood, hallucinations, self-injury and suicidal ideas. The patient is not nervous/anxious and is not hyperactive.        Objective   Physical Exam   Constitutional: She appears well-developed and well-nourished.   Psychiatric: She has a normal mood and affect. Her speech is normal and behavior is normal. Judgment and thought content normal. Cognition and memory are normal.   Vitals reviewed.    Blood pressure 125/76, pulse 89, weight 77 kg (169 lb 12.8 oz).    Medication List:   Current Outpatient Prescriptions   Medication Sig Dispense Refill   • atenolol (TENORMIN) 25 MG tablet Take 12.5 mg by mouth Daily.     • Buprenorphine HCl-Naloxone HCl 11.4-2.9 MG sublingual tablet Place 11.4 mg under the tongue Daily. 29 tablet 0   • CALTRATE 600+D 600-800 MG-UNIT tablet      • ibuprofen (ADVIL,MOTRIN) 600 MG tablet Take 600 mg by mouth Every 6 (Six) Hours As Needed for mild pain (1-3).     • isosorbide mononitrate (IMDUR) 60 MG 24 hr tablet Take 60 mg by mouth Daily.  5   • levothyroxine (SYNTHROID, LEVOTHROID) 50 MCG tablet   2   • metoclopramide (REGLAN) 10 MG tablet   2   • omeprazole (PriLOSEC) 40 MG capsule Take 40 mg by mouth Daily.     • ondansetron (ZOFRAN) 8 MG tablet Take 8 mg by mouth Every 8 (Eight) Hours As Needed.     • PARoxetine (PAXIL) 10 MG tablet Take 1 tablet by mouth Every Morning. 30 tablet 2   • TRAVEL SICKNESS 25 MG chewable tablet chewable tablet As Needed.  0   • vitamin D (ERGOCALCIFEROL) 52613 units capsule capsule Take 50,000 Units by mouth Every 7 (Seven) Days.       No current facility-administered medications for this visit.        Mental Status Exam:   Hygiene:   good  Cooperation:  Cooperative  Eye Contact:  Good  Psychomotor Behavior:  Appropriate  Affect:  Full range  Hopelessness: Denies  Speech:  Normal  Thought Process:  Goal directed  Thought Content:  Normal  Suicidal:  None  Homicidal:  None  Hallucinations:  None  Delusion:  None  Memory:   Intact  Orientation:  Person, Place, Time and Situation  Reliability:  good  Insight:  Good  Judgement:  Good  Impulse Control:  Fair  Physical/Medical Issues:  Yes hypothyroidism    Assessment/Plan   Problems Addressed this Visit     None      Visit Diagnoses     Uncomplicated opioid dependence (CMS/HCC)    -  Primary    Relevant Medications    Buprenorphine HCl-Naloxone HCl 11.4-2.9 MG sublingual tablet    Moderate episode of recurrent major depressive disorder (CMS/HCC)        Medication management            Functionality: pt having minimal impairment in important areas of daily functioning.  Prognosis:  Guarded dependent on medication/follow up and treatment plan compliance.  Alexx reviewed and appropriate.            Patient's narcotic anonymous meeting long was scanned into Epic.  Patient has also brought documentation of therapy appointments at her [facility] however she is not pleased with her therapist there.  She is thinking on changing therapists.  I have given her information for a therapist at [facility] in [place].  She will bring me a documentation of him ever she sees.  She has 4 remaining tablets of the [facility] and therefore I will schedule her appropriately..  . She states she has enough of the Paxil and does not require a prescription today.  Patient is agreeable to call the [facility] any worsening of symptoms.  Patient is aware to call 911 or go to the nearest ER should begin having SI/HI.

## 2018-10-08 ENCOUNTER — OFFICE VISIT (OUTPATIENT)
Dept: PSYCHIATRY | Facility: CLINIC | Age: 38
End: 2018-10-08

## 2018-10-08 VITALS
HEIGHT: 64 IN | SYSTOLIC BLOOD PRESSURE: 108 MMHG | BODY MASS INDEX: 28.89 KG/M2 | WEIGHT: 169.2 LBS | HEART RATE: 87 BPM | DIASTOLIC BLOOD PRESSURE: 71 MMHG

## 2018-10-08 DIAGNOSIS — Z79.899 MEDICATION MANAGEMENT: Primary | ICD-10-CM

## 2018-10-08 DIAGNOSIS — F33.1 MODERATE EPISODE OF RECURRENT MAJOR DEPRESSIVE DISORDER (HCC): ICD-10-CM

## 2018-10-08 DIAGNOSIS — F11.20 UNCOMPLICATED OPIOID DEPENDENCE (HCC): ICD-10-CM

## 2018-10-08 PROCEDURE — 99214 OFFICE O/P EST MOD 30 MIN: CPT | Performed by: NURSE PRACTITIONER

## 2018-10-08 RX ORDER — LANOLIN ALCOHOL/MO/W.PET/CERES
1000 CREAM (GRAM) TOPICAL DAILY
Refills: 2 | COMMUNITY
Start: 2018-09-21 | End: 2019-09-16

## 2018-10-08 RX ORDER — LEVOTHYROXINE SODIUM 0.1 MG/1
100 TABLET ORAL DAILY
Refills: 2 | COMMUNITY
Start: 2018-09-21

## 2018-10-08 RX ORDER — ASPIRIN 325 MG
325 TABLET, DELAYED RELEASE (ENTERIC COATED) ORAL DAILY
Refills: 0 | COMMUNITY
Start: 2018-09-21 | End: 2019-08-21 | Stop reason: ALTCHOICE

## 2018-10-08 RX ORDER — PAROXETINE 10 MG/1
10 TABLET, FILM COATED ORAL EVERY MORNING
Qty: 30 TABLET | Refills: 2 | Status: SHIPPED | OUTPATIENT
Start: 2018-10-08 | End: 2018-12-26 | Stop reason: SDUPTHER

## 2018-10-08 NOTE — PROGRESS NOTES
Subjective   Adina Thomas is a 38 y.o. female is here today for medication management follow-up.    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 Rates depression and anxiety both a 2/10 with 10 being worse.  Denies suicidal thoughts, homicidal thoughts, or any A/V hallucinations.  She states the current dose of buprenorphine is working well.  She denies any cravings.  She is sleeping at least 5-6 hours a night.  There are no new medical stressors.  She continues to try and have relationship with her teenage daughter.  She feels frustrated because the grandmother has more control over the daughter and she does not feel like she does everything appropriately as far as how she raises her.     The following portions of the patient's history were reviewed and updated as appropriate: allergies, current medications, past medical history, past social history, past surgical history and problem list.    Review of Systems   Constitutional: Negative for activity change, appetite change and fatigue.   HENT: Negative.    Eyes: Negative for visual disturbance.   Respiratory: Negative.    Cardiovascular: Negative.    Gastrointestinal: Negative for nausea.   Endocrine: Negative.    Genitourinary: Negative.    Musculoskeletal: Negative for arthralgias.   Skin: Negative.    Allergic/Immunologic: Negative.    Neurological: Negative for dizziness, seizures and headaches.   Hematological: Negative.    Psychiatric/Behavioral: Positive for sleep disturbance. Negative for agitation, behavioral problems, confusion, decreased concentration, dysphoric mood, hallucinations, self-injury and suicidal ideas. The patient is not nervous/anxious and is not hyperactive.        Objective   Physical Exam   Constitutional: She appears well-developed and well-nourished.   Psychiatric: She has a normal mood and  "affect. Her speech is normal and behavior is normal. Judgment and thought content normal. Cognition and memory are normal.   Vitals reviewed.    Blood pressure 108/71, pulse 87, height 162.6 cm (64\"), weight 76.7 kg (169 lb 3.2 oz).    Medication List:   Current Outpatient Prescriptions   Medication Sig Dispense Refill   • aspirin  MG tablet Take 325 mg by mouth Daily.  0   • atenolol (TENORMIN) 25 MG tablet Take 12.5 mg by mouth Daily.     • Buprenorphine HCl-Naloxone HCl 11.4-2.9 MG sublingual tablet Place 11.4 mg under the tongue Daily. 28 tablet 0   • CALTRATE 600+D 600-800 MG-UNIT tablet      • ibuprofen (ADVIL,MOTRIN) 600 MG tablet Take 600 mg by mouth Every 6 (Six) Hours As Needed for mild pain (1-3).     • isosorbide mononitrate (IMDUR) 60 MG 24 hr tablet Take 60 mg by mouth Daily.  5   • levothyroxine (SYNTHROID, LEVOTHROID) 100 MCG tablet Take 100 mcg by mouth Daily.  2   • levothyroxine (SYNTHROID, LEVOTHROID) 50 MCG tablet   2   • metoclopramide (REGLAN) 10 MG tablet   2   • omeprazole (PriLOSEC) 40 MG capsule Take 40 mg by mouth Daily.     • ondansetron (ZOFRAN) 8 MG tablet Take 8 mg by mouth Every 8 (Eight) Hours As Needed.     • PARoxetine (PAXIL) 10 MG tablet Take 1 tablet by mouth Every Morning. 30 tablet 2   • TRAVEL SICKNESS 25 MG chewable tablet chewable tablet As Needed.  0   • vitamin B-12 (CYANOCOBALAMIN) 1000 MCG tablet Take 1,000 mcg by mouth Daily.  2   • vitamin D (ERGOCALCIFEROL) 19973 units capsule capsule Take 50,000 Units by mouth Every 7 (Seven) Days.       No current facility-administered medications for this visit.        Mental Status Exam:   Hygiene:   good  Cooperation:  Cooperative  Eye Contact:  Good  Psychomotor Behavior:  Appropriate  Affect:  Full range  Hopelessness: Denies  Speech:  Normal  Thought Process:  Goal directed  Thought Content:  Normal  Suicidal:  None  Homicidal:  None  Hallucinations:  None  Delusion:  None  Memory:  Intact  Orientation:  Person, Place, " Time and Situation  Reliability:  good  Insight:  Good  Judgement:  Good  Impulse Control:  Fair  Physical/Medical Issues:  Yes hypothyroidism    Assessment/Plan   Problems Addressed this Visit     None      Visit Diagnoses     Medication management    -  Primary    Relevant Orders    [facility] Drug Screen (Completed)    Uncomplicated opioid dependence (CMS/HCC)        Relevant Medications    Buprenorphine HCl-Naloxone HCl 11.4-2.9 MG sublingual tablet    Moderate episode of recurrent major depressive disorder (CMS/HCC)        Relevant Medications    PARoxetine (PAXIL) 10 MG tablet        Functionality: pt having minimal impairment in important areas of daily functioning.  Prognosis:  Guarded dependent on medication/follow up and treatment plan compliance.  Alexx reviewed and appropriate.  UDS performed positive for buprenorphine    Pt has continued to see therapist at [facility].  She is getting along with new therapist.  She has attended NA meetings weekly at [facility] in [place], [place].  She Sebastian Hook is the leader.  She has brought in documentation of the meetings and this has been scanned into epic. Continue with Paxil at current dose.  Lengthy discussion with patient on the goal of tapering down on the buprenorphine. She states that Dr Moreau had told her he thought she would always have to use it.  I told her that was fine however a lower dose would be more beneficial.  She states she would like to come down but she is not ready to yet.  This is a definite goal with this patient.        Patient is agreeable to call the [facility] any worsening of symptoms.  Patient is aware to call 911 or go to the nearest ER should begin having SI/HI.

## 2018-10-31 ENCOUNTER — OFFICE VISIT (OUTPATIENT)
Dept: PSYCHIATRY | Facility: CLINIC | Age: 38
End: 2018-10-31

## 2018-10-31 VITALS
WEIGHT: 166.8 LBS | HEART RATE: 94 BPM | BODY MASS INDEX: 28.48 KG/M2 | SYSTOLIC BLOOD PRESSURE: 127 MMHG | DIASTOLIC BLOOD PRESSURE: 81 MMHG | HEIGHT: 64 IN

## 2018-10-31 DIAGNOSIS — F41.0 PANIC DISORDER: ICD-10-CM

## 2018-10-31 DIAGNOSIS — F33.1 MODERATE EPISODE OF RECURRENT MAJOR DEPRESSIVE DISORDER (HCC): ICD-10-CM

## 2018-10-31 DIAGNOSIS — F11.20 UNCOMPLICATED OPIOID DEPENDENCE (HCC): Primary | ICD-10-CM

## 2018-10-31 PROCEDURE — 99214 OFFICE O/P EST MOD 30 MIN: CPT | Performed by: NURSE PRACTITIONER

## 2018-10-31 NOTE — PROGRESS NOTES
Subjective   Adina Thomas is a 38 y.o. female is here today for medication management follow-up.    Chief Complaint recheck on buprenorphine treatment  History of Present Illness: pt has had recent medical stressors.  She had an allergic reaction to a new facial cream.  Her face was severely swollen and she had to go to the ER.  She then had a tooth extracted and an infection ensued.  She is on antibiotics.  Did not take any pain medication other than ibuprofen.  Currently denies any depression or anxiety.  States the Paxil is working well.  Denies any negative side effects to the medication.  She denies any opioid cravings.Body mass index is 28.63 kg/m².  No appetite changes.  No other medical stressors other than listed above.  Says she is sleeping better at least 6-7 hours a night without difficulty.She has a Mirena for birth control       The following portions of the patient's history were reviewed and updated as appropriate: allergies, current medications, past medical history, past social history, past surgical history and problem list.    Review of Systems   Constitutional: Negative for activity change, appetite change and fatigue.   HENT: Negative.    Eyes: Negative for visual disturbance.   Respiratory: Negative.    Cardiovascular: Negative.    Gastrointestinal: Negative for nausea.   Endocrine: Negative.    Genitourinary: Negative.    Musculoskeletal: Negative for arthralgias.   Skin: Negative.    Allergic/Immunologic: Negative.    Neurological: Negative for dizziness, seizures and headaches.   Hematological: Negative.    Psychiatric/Behavioral: Negative for agitation, behavioral problems, confusion, decreased concentration, dysphoric mood, hallucinations, self-injury, sleep disturbance and suicidal ideas. The patient is not nervous/anxious and is not hyperactive.        Objective   Physical Exam   Constitutional: She appears well-developed and well-nourished.   Psychiatric: She has a normal mood  "and affect. Her speech is normal and behavior is normal. Judgment and thought content normal. Cognition and memory are normal.   Vitals reviewed.    Blood pressure 127/81, pulse 94, height 162.6 cm (64\"), weight 75.7 kg (166 lb 12.8 oz).    Medication List:   Current Outpatient Prescriptions   Medication Sig Dispense Refill   • aspirin  MG tablet Take 325 mg by mouth Daily.  0   • atenolol (TENORMIN) 25 MG tablet Take 12.5 mg by mouth Daily.     • Buprenorphine HCl-Naloxone HCl 11.4-2.9 MG sublingual tablet Place 11.4 mg under the tongue Daily. 28 tablet 0   • CALTRATE 600+D 600-800 MG-UNIT tablet      • ibuprofen (ADVIL,MOTRIN) 600 MG tablet Take 600 mg by mouth Every 6 (Six) Hours As Needed for mild pain (1-3).     • isosorbide mononitrate (IMDUR) 60 MG 24 hr tablet Take 60 mg by mouth Daily.  5   • levothyroxine (SYNTHROID, LEVOTHROID) 100 MCG tablet Take 100 mcg by mouth Daily.  2   • levothyroxine (SYNTHROID, LEVOTHROID) 50 MCG tablet   2   • metoclopramide (REGLAN) 10 MG tablet   2   • omeprazole (PriLOSEC) 40 MG capsule Take 40 mg by mouth Daily.     • ondansetron (ZOFRAN) 8 MG tablet Take 8 mg by mouth Every 8 (Eight) Hours As Needed.     • PARoxetine (PAXIL) 10 MG tablet Take 1 tablet by mouth Every Morning. 30 tablet 2   • TRAVEL SICKNESS 25 MG chewable tablet chewable tablet As Needed.  0   • vitamin B-12 (CYANOCOBALAMIN) 1000 MCG tablet Take 1,000 mcg by mouth Daily.  2   • vitamin D (ERGOCALCIFEROL) 41266 units capsule capsule Take 50,000 Units by mouth Every 7 (Seven) Days.       No current facility-administered medications for this visit.        Mental Status Exam:   Hygiene:   good  Cooperation:  Cooperative  Eye Contact:  Good  Psychomotor Behavior:  Appropriate  Affect:  Full range  Hopelessness: Denies  Speech:  Normal  Thought Process:  Goal directed  Thought Content:  Normal  Suicidal:  None  Homicidal:  None  Hallucinations:  None  Delusion:  None  Memory:  Intact  Orientation:  Person, " Place, Time and Situation  Reliability:  good  Insight:  Good  Judgement:  Good  Impulse Control:  Fair  Physical/Medical Issues:  Yes hypothyroidism    Assessment/Plan   Problems Addressed this Visit     None      Visit Diagnoses     Uncomplicated opioid dependence (CMS/HCC)    -  Primary    Relevant Medications    Buprenorphine HCl-Naloxone HCl 11.4-2.9 MG sublingual tablet    Moderate episode of recurrent major depressive disorder (CMS/HCC)        Panic disorder            Functionality: pt having minimal impairment in important areas of daily functioning.  Prognosis:  Guarded dependent on medication/follow up and treatment plan compliance.  Alexx reviewed and appropriate.  UDS performed today and is pending.  Previous ones reviewed and appropriate.     Pt has continued to see therapist at [facility].  She has been seen on 10/25/18.  She has 2 upcoming appointments.  She has been to 7 NA meetings and the documentation has been scanned into epic. Continue with Paxil at current dose. Patient is agreeable to call the [facility] any worsening of symptoms.  She continues to be compliant with buprenorphine and medication assisted treatment.  Patient is aware to call 911 or go to the nearest ER should begin having SI/HI. RTC 4 weeks.

## 2018-11-01 ENCOUNTER — PRIOR AUTHORIZATION (OUTPATIENT)
Dept: PSYCHIATRY | Facility: CLINIC | Age: 38
End: 2018-11-01

## 2018-11-27 ENCOUNTER — OFFICE VISIT (OUTPATIENT)
Dept: PSYCHIATRY | Facility: CLINIC | Age: 38
End: 2018-11-27

## 2018-11-27 VITALS
WEIGHT: 168 LBS | SYSTOLIC BLOOD PRESSURE: 120 MMHG | DIASTOLIC BLOOD PRESSURE: 80 MMHG | HEIGHT: 64 IN | BODY MASS INDEX: 28.68 KG/M2 | HEART RATE: 97 BPM

## 2018-11-27 DIAGNOSIS — F11.20 UNCOMPLICATED OPIOID DEPENDENCE (HCC): ICD-10-CM

## 2018-11-27 DIAGNOSIS — F33.1 MODERATE EPISODE OF RECURRENT MAJOR DEPRESSIVE DISORDER (HCC): ICD-10-CM

## 2018-11-27 DIAGNOSIS — Z79.899 MEDICATION MANAGEMENT: ICD-10-CM

## 2018-11-27 DIAGNOSIS — F11.20 OPIOID DEPENDENCE ON AGONIST THERAPY (HCC): Primary | ICD-10-CM

## 2018-11-27 DIAGNOSIS — F41.0 PANIC DISORDER: ICD-10-CM

## 2018-11-27 PROCEDURE — 99214 OFFICE O/P EST MOD 30 MIN: CPT | Performed by: NURSE PRACTITIONER

## 2018-11-27 NOTE — PROGRESS NOTES
Subjective   Adina Thomas is a 38 y.o. female is here today for medication management follow-up.    Chief Complaint recheck on buprenorphine treatment  History of Present Illness:Pt states she is doing well.  Had some issues with teeth in that she developed a dry socket after the extraction.  This has resolved.  She took ibuprofen for the pain.  She has to have another one extracted at some point in the future.   Rates depression a 1-2/10 with 10 being worse. Anxiety about the same.  Denies any suicidal thoughts, homicidal thoughts, or any a/V hallucinations. Denies cravings.  Sleeping at least 5-6 hours nightly.  Body mass index is 28.84 kg/m². no weight changes.  Mood is stable.  Denies irritability.  Looking forward to the holidays.        The following portions of the patient's history were reviewed and updated as appropriate: allergies, current medications, past medical history, past social history, past surgical history and problem list.    Review of Systems   Constitutional: Negative for activity change, appetite change and fatigue.   HENT: Negative.    Eyes: Negative for visual disturbance.   Respiratory: Negative.    Cardiovascular: Negative.    Gastrointestinal: Negative for nausea.   Endocrine: Negative.    Genitourinary: Negative.    Musculoskeletal: Negative for arthralgias.   Skin: Negative.    Allergic/Immunologic: Negative.    Neurological: Negative for dizziness, seizures and headaches.   Hematological: Negative.    Psychiatric/Behavioral: Negative for agitation, behavioral problems, confusion, decreased concentration, dysphoric mood, hallucinations, self-injury, sleep disturbance and suicidal ideas. The patient is not nervous/anxious and is not hyperactive.        Objective   Physical Exam   Constitutional: She appears well-developed and well-nourished.   Psychiatric: She has a normal mood and affect. Her speech is normal and behavior is normal. Judgment and thought content normal.  "Cognition and memory are normal.   Vitals reviewed.    Blood pressure 120/80, pulse 97, height 162.6 cm (64\"), weight 76.2 kg (168 lb).    Medication List:   Current Outpatient Medications   Medication Sig Dispense Refill   • aspirin  MG tablet Take 325 mg by mouth Daily.  0   • atenolol (TENORMIN) 25 MG tablet Take 12.5 mg by mouth Daily.     • Buprenorphine HCl-Naloxone HCl 11.4-2.9 MG sublingual tablet Place 11.4 mg under the tongue Daily. 28 tablet 0   • CALTRATE 600+D 600-800 MG-UNIT tablet      • ibuprofen (ADVIL,MOTRIN) 600 MG tablet Take 600 mg by mouth Every 6 (Six) Hours As Needed for mild pain (1-3).     • isosorbide mononitrate (IMDUR) 60 MG 24 hr tablet Take 60 mg by mouth Daily.  5   • levothyroxine (SYNTHROID, LEVOTHROID) 100 MCG tablet Take 100 mcg by mouth Daily.  2   • levothyroxine (SYNTHROID, LEVOTHROID) 50 MCG tablet   2   • metoclopramide (REGLAN) 10 MG tablet   2   • omeprazole (PriLOSEC) 40 MG capsule Take 40 mg by mouth Daily.     • ondansetron (ZOFRAN) 8 MG tablet Take 8 mg by mouth Every 8 (Eight) Hours As Needed.     • PARoxetine (PAXIL) 10 MG tablet Take 1 tablet by mouth Every Morning. 30 tablet 2   • TRAVEL SICKNESS 25 MG chewable tablet chewable tablet As Needed.  0   • vitamin B-12 (CYANOCOBALAMIN) 1000 MCG tablet Take 1,000 mcg by mouth Daily.  2   • vitamin D (ERGOCALCIFEROL) 57134 units capsule capsule Take 50,000 Units by mouth Every 7 (Seven) Days.       No current facility-administered medications for this visit.        Mental Status Exam:   Hygiene:   good  Cooperation:  Cooperative  Eye Contact:  Good  Psychomotor Behavior:  Appropriate  Affect:  Full range  Hopelessness: Denies  Speech:  Normal  Thought Process:  Goal directed  Thought Content:  Normal  Suicidal:  None  Homicidal:  None  Hallucinations:  None  Delusion:  None  Memory:  Intact  Orientation:  Person, Place, Time and Situation  Reliability:  good  Insight:  Good  Judgement:  Good  Impulse Control:  " Fair  Physical/Medical Issues:  Yes hypothyroidism    Assessment/Plan   Problems Addressed this Visit     None      Visit Diagnoses     Opioid dependence on agonist therapy (CMS/HCC)    -  Primary    Moderate episode of recurrent major depressive disorder (CMS/HCC)        Panic disorder        Medication management        Uncomplicated opioid dependence (CMS/HCC)        Relevant Medications    Buprenorphine HCl-Naloxone HCl 11.4-2.9 MG sublingual tablet        Functionality: pt having minimal impairment in important areas of daily functioning.  Prognosis:  Guarded dependent on medication/follow up and treatment plan compliance.  Alexx reviewed and appropriate.  UDS performed today and is pending.  Previous ones reviewed and appropriate.     Pt has continued to see therapist at [facility].  She has been seen on 11/14/18.   She has been to 8 NA meetings and the documentation has been scanned into epic. Continue with Paxil at current dose. Patient is agreeable to call the [facility] any worsening of symptoms.  She continues to be compliant with buprenorphine and medication assisted treatment.  Patient is aware to call 911 or go to the nearest ER should begin having SI/HI. RTC 4 weeks.

## 2018-12-18 ENCOUNTER — TELEPHONE (OUTPATIENT)
Dept: PSYCHIATRY | Facility: CLINIC | Age: 38
End: 2018-12-18

## 2018-12-18 NOTE — TELEPHONE ENCOUNTER
Patient called stating she doesn't understand how it happened by she is going be one day short on her Buprenorphine by Friday.

## 2018-12-20 NOTE — TELEPHONE ENCOUNTER
Patient states that on the 25th will make 28 days which is when she was supposed to come back but due to it been a holiday we had to schedule her on the 26th , is why she will be a day short

## 2018-12-20 NOTE — TELEPHONE ENCOUNTER
According to her Alexx she received them on the 27th and was given 28.  That gives her enough till the 25th with her appointment the next day.  I cannot legally prescribe another one

## 2018-12-21 NOTE — TELEPHONE ENCOUNTER
I called and spoke to pharmacist and Iveth and she agreed that I was correct.  Pt is to keep her appointment on the 26th

## 2018-12-26 ENCOUNTER — OFFICE VISIT (OUTPATIENT)
Dept: PSYCHIATRY | Facility: CLINIC | Age: 38
End: 2018-12-26

## 2018-12-26 VITALS
HEIGHT: 64 IN | DIASTOLIC BLOOD PRESSURE: 86 MMHG | HEART RATE: 89 BPM | BODY MASS INDEX: 27.49 KG/M2 | WEIGHT: 161 LBS | SYSTOLIC BLOOD PRESSURE: 124 MMHG

## 2018-12-26 DIAGNOSIS — F33.1 MODERATE EPISODE OF RECURRENT MAJOR DEPRESSIVE DISORDER (HCC): ICD-10-CM

## 2018-12-26 DIAGNOSIS — F11.20 OPIOID DEPENDENCE ON AGONIST THERAPY (HCC): Primary | ICD-10-CM

## 2018-12-26 DIAGNOSIS — F11.20 UNCOMPLICATED OPIOID DEPENDENCE (HCC): ICD-10-CM

## 2018-12-26 DIAGNOSIS — F41.0 PANIC DISORDER: ICD-10-CM

## 2018-12-26 PROCEDURE — 99214 OFFICE O/P EST MOD 30 MIN: CPT | Performed by: NURSE PRACTITIONER

## 2018-12-26 RX ORDER — PAROXETINE 10 MG/1
10 TABLET, FILM COATED ORAL EVERY MORNING
Qty: 30 TABLET | Refills: 2 | Status: SHIPPED | OUTPATIENT
Start: 2018-12-26 | End: 2019-03-19 | Stop reason: SDUPTHER

## 2018-12-26 NOTE — PROGRESS NOTES
Subjective   Adina Thomas is a 38 y.o. female is here today for medication management follow-up.    Chief Complaint recheck on buprenorphine treatment  History of Present Illness:Pt presents for follow up at the [facility].  She had an uncle pass away last night on [place] day and this has been upsetting.   Overall rates depression and anxiety a 1-2/10 with 10 being worse.  Anatomy suicidal thoughts, homicidal thoughts, or any AV hallucinations.  She is sleeping well at least 8 hours a night without difficulty.  She denies any cravings.  She denies any medical stressors.Body mass index is 27.64 kg/m². No appetite changes. Continues to be compliant with counseling appointments and NA meetings.           The following portions of the patient's history were reviewed and updated as appropriate: allergies, current medications, past medical history, past social history, past surgical history and problem list.    Review of Systems   Constitutional: Negative for activity change, appetite change and fatigue.   HENT: Negative.    Eyes: Negative for visual disturbance.   Respiratory: Negative.    Cardiovascular: Negative.    Gastrointestinal: Negative for nausea.   Endocrine: Negative.    Genitourinary: Negative.    Musculoskeletal: Negative for arthralgias.   Skin: Negative.    Allergic/Immunologic: Negative.    Neurological: Negative for dizziness, seizures and headaches.   Hematological: Negative.    Psychiatric/Behavioral: Negative for agitation, behavioral problems, confusion, decreased concentration, dysphoric mood, hallucinations, self-injury, sleep disturbance and suicidal ideas. The patient is not nervous/anxious and is not hyperactive.        Objective   Physical Exam   Constitutional: She appears well-developed and well-nourished.   Psychiatric: She has a normal mood and affect. Her speech is normal and behavior is normal. Judgment and thought content normal. Cognition and memory are normal.  "  Vitals reviewed.    Blood pressure 124/86, pulse 89, height 162.6 cm (64\"), weight 73 kg (161 lb).    Medication List:   Current Outpatient Medications   Medication Sig Dispense Refill   • aspirin  MG tablet Take 325 mg by mouth Daily.  0   • atenolol (TENORMIN) 25 MG tablet Take 12.5 mg by mouth Daily.     • Buprenorphine HCl-Naloxone HCl 11.4-2.9 MG sublingual tablet Place 11.4 mg under the tongue Daily. 29 tablet 0   • CALTRATE 600+D 600-800 MG-UNIT tablet      • ibuprofen (ADVIL,MOTRIN) 600 MG tablet Take 600 mg by mouth Every 6 (Six) Hours As Needed for mild pain (1-3).     • isosorbide mononitrate (IMDUR) 60 MG 24 hr tablet Take 60 mg by mouth Daily.  5   • levothyroxine (SYNTHROID, LEVOTHROID) 100 MCG tablet Take 100 mcg by mouth Daily.  2   • metoclopramide (REGLAN) 10 MG tablet   2   • omeprazole (PriLOSEC) 40 MG capsule Take 40 mg by mouth Daily.     • ondansetron (ZOFRAN) 8 MG tablet Take 8 mg by mouth Every 8 (Eight) Hours As Needed.     • PARoxetine (PAXIL) 10 MG tablet Take 1 tablet by mouth Every Morning. 30 tablet 2   • TRAVEL SICKNESS 25 MG chewable tablet chewable tablet As Needed.  0   • vitamin B-12 (CYANOCOBALAMIN) 1000 MCG tablet Take 1,000 mcg by mouth Daily.  2   • vitamin D (ERGOCALCIFEROL) 99620 units capsule capsule Take 50,000 Units by mouth Every 7 (Seven) Days.       No current facility-administered medications for this visit.        Mental Status Exam:   Hygiene:   good  Cooperation:  Cooperative  Eye Contact:  Good  Psychomotor Behavior:  Appropriate  Affect:  Full range  Hopelessness: Denies  Speech:  Normal  Thought Process:  Goal directed  Thought Content:  Normal  Suicidal:  None  Homicidal:  None  Hallucinations:  None  Delusion:  None  Memory:  Intact  Orientation:  Person, Place, Time and Situation  Reliability:  good  Insight:  Good  Judgement:  Good  Impulse Control:  Fair  Physical/Medical Issues:  Yes hypothyroidism    Assessment/Plan   Problems Addressed this Visit "     None      Visit Diagnoses     Opioid dependence on agonist therapy (CMS/HCC)    -  Primary    Moderate episode of recurrent major depressive disorder (CMS/HCC)        Relevant Medications    PARoxetine (PAXIL) 10 MG tablet    Panic disorder        Relevant Medications    PARoxetine (PAXIL) 10 MG tablet    Uncomplicated opioid dependence (CMS/HCC)        Relevant Medications    Buprenorphine HCl-Naloxone HCl 11.4-2.9 MG sublingual tablet        Functionality: pt having minimal impairment in important areas of daily functioning.  Prognosis:  Guarded dependent on medication/follow up and treatment plan compliance.  Alexx reviewed and appropriate.  UDS performed today and is pending.  Previous ones reviewed and appropriate.     Pt has continued to see therapist at [facility].  She has been seen on 12/.20/18.   She has been to 7 NA meetings and the documentation has been scanned into epic. Continue with Paxil at current dose. Patient is agreeable to call the [facility] any worsening of symptoms.  She continues to be compliant with buprenorphine and medication assisted treatment.  Patient is aware to call 911 or go to the nearest ER should begin having SI/HI. RTC 4 weeks.

## 2019-01-23 ENCOUNTER — OFFICE VISIT (OUTPATIENT)
Dept: PSYCHIATRY | Facility: CLINIC | Age: 39
End: 2019-01-23

## 2019-01-23 VITALS
BODY MASS INDEX: 27.79 KG/M2 | DIASTOLIC BLOOD PRESSURE: 73 MMHG | HEART RATE: 84 BPM | SYSTOLIC BLOOD PRESSURE: 114 MMHG | HEIGHT: 64 IN | WEIGHT: 162.8 LBS

## 2019-01-23 DIAGNOSIS — F33.1 MODERATE EPISODE OF RECURRENT MAJOR DEPRESSIVE DISORDER (HCC): ICD-10-CM

## 2019-01-23 DIAGNOSIS — F11.20 UNCOMPLICATED OPIOID DEPENDENCE (HCC): ICD-10-CM

## 2019-01-23 DIAGNOSIS — F41.0 PANIC DISORDER: ICD-10-CM

## 2019-01-23 DIAGNOSIS — F11.20 OPIOID DEPENDENCE ON AGONIST THERAPY (HCC): Primary | ICD-10-CM

## 2019-01-23 PROCEDURE — 99214 OFFICE O/P EST MOD 30 MIN: CPT | Performed by: NURSE PRACTITIONER

## 2019-01-23 RX ORDER — HYDROXYZINE HYDROCHLORIDE 10 MG/1
10 TABLET, FILM COATED ORAL 3 TIMES DAILY PRN
Qty: 90 TABLET | Refills: 0 | Status: SHIPPED | OUTPATIENT
Start: 2019-01-23 | End: 2019-08-21 | Stop reason: SDDI

## 2019-01-23 NOTE — PROGRESS NOTES
Subjective   Adina Thomas is a 38 y.o. female is here today for medication management follow-up.    Chief Complaint recheck on buprenorphine treatment  History of Present Illness:Pt presents for follow up at the [facility].  Current stressors include a current ear infection and personal stress with her daughter.  She has had to take her daughter to be evaluated at the [facility] with depression. The grandmother and great grandmother continue to say thing about the situation to the patient.  Body mass index is 27.94 kg/m². appetite is down. No changes in her sleep pattern. She takes her Zubsolv in the morning. She denies depression.  Anxiety is high right now due to situation with her daughter. She denies any cravings.  She denies any suicidal thoughts.                  The following portions of the patient's history were reviewed and updated as appropriate: allergies, current medications, past medical history, past social history, past surgical history and problem list.    Review of Systems   Constitutional: Negative for activity change, appetite change and fatigue.   HENT: Positive for ear pain.    Eyes: Negative for visual disturbance.   Respiratory: Negative.    Cardiovascular: Negative.    Gastrointestinal: Negative for nausea.   Endocrine: Negative.    Genitourinary: Negative.    Musculoskeletal: Negative for arthralgias.   Skin: Negative.    Allergic/Immunologic: Negative.    Neurological: Negative for dizziness, seizures and headaches.   Hematological: Negative.    Psychiatric/Behavioral: Negative for agitation, behavioral problems, confusion, decreased concentration, dysphoric mood, hallucinations, self-injury, sleep disturbance and suicidal ideas. The patient is nervous/anxious. The patient is not hyperactive.        Objective   Physical Exam   Constitutional: She is oriented to person, place, and time. She appears well-developed and well-nourished.   HENT:   Head: Normocephalic and  "atraumatic.   Neurological: She is alert and oriented to person, place, and time.   Psychiatric: She has a normal mood and affect. Her speech is normal and behavior is normal. Judgment and thought content normal. Cognition and memory are normal.   Vitals reviewed.    Blood pressure 114/73, pulse 84, height 162.6 cm (64\"), weight 73.8 kg (162 lb 12.8 oz).    Medication List:   Current Outpatient Medications   Medication Sig Dispense Refill   • aspirin  MG tablet Take 325 mg by mouth Daily.  0   • atenolol (TENORMIN) 25 MG tablet Take 12.5 mg by mouth Daily.     • Buprenorphine HCl-Naloxone HCl 11.4-2.9 MG sublingual tablet Place 11.4 mg under the tongue Daily. 28 tablet 0   • CALTRATE 600+D 600-800 MG-UNIT tablet      • hydrOXYzine (ATARAX) 10 MG tablet Take 1 tablet by mouth 3 (Three) Times a Day As Needed for Anxiety. 90 tablet 0   • ibuprofen (ADVIL,MOTRIN) 600 MG tablet Take 600 mg by mouth Every 6 (Six) Hours As Needed for mild pain (1-3).     • isosorbide mononitrate (IMDUR) 60 MG 24 hr tablet Take 60 mg by mouth Daily.  5   • levothyroxine (SYNTHROID, LEVOTHROID) 100 MCG tablet Take 100 mcg by mouth Daily.  2   • metoclopramide (REGLAN) 10 MG tablet   2   • omeprazole (PriLOSEC) 40 MG capsule Take 40 mg by mouth Daily.     • ondansetron (ZOFRAN) 8 MG tablet Take 8 mg by mouth Every 8 (Eight) Hours As Needed.     • PARoxetine (PAXIL) 10 MG tablet Take 1 tablet by mouth Every Morning. 30 tablet 2   • TRAVEL SICKNESS 25 MG chewable tablet chewable tablet As Needed.  0   • vitamin B-12 (CYANOCOBALAMIN) 1000 MCG tablet Take 1,000 mcg by mouth Daily.  2   • vitamin D (ERGOCALCIFEROL) 16023 units capsule capsule Take 50,000 Units by mouth Every 7 (Seven) Days.       No current facility-administered medications for this visit.        Mental Status Exam:   Hygiene:   good  Cooperation:  Cooperative  Eye Contact:  Good  Psychomotor Behavior:  Appropriate  Affect:  Full range  Hopelessness: Denies  Speech:  " Normal  Thought Process:  Goal directed  Thought Content:  Normal  Suicidal:  None  Homicidal:  None  Hallucinations:  None  Delusion:  None  Memory:  Intact  Orientation:  Person, Place, Time and Situation  Reliability:  good  Insight:  Good  Judgement:  Good  Impulse Control:  Fair  Physical/Medical Issues:  Yes hypothyroidism    Assessment/Plan   Problems Addressed this Visit     None      Visit Diagnoses     Opioid dependence on agonist therapy (CMS/HCC)    -  Primary    Moderate episode of recurrent major depressive disorder (CMS/HCC)        Relevant Medications    hydrOXYzine (ATARAX) 10 MG tablet    Panic disorder        Relevant Medications    hydrOXYzine (ATARAX) 10 MG tablet    Uncomplicated opioid dependence (CMS/HCC)        Relevant Medications    Buprenorphine HCl-Naloxone HCl 11.4-2.9 MG sublingual tablet        Functionality: pt having minimal impairment in important areas of daily functioning.  Prognosis:  Guarded dependent on medication/follow up and treatment plan compliance.  Alexx reviewed and appropriate.  UDS performed today and is pending.  Previous ones reviewed and appropriate.      Pt's current anxiety is more situational in nature and she does not desire to increase the paxil.  Continue with Paxil at current dose.  I am adding low dose atarax for anxiety.  Pt is aware that this med can cause drowsiness.  She has brought in documentation of her counselor appointment at [facility] and her N/A meetings and this has been scanned into epic.  Patient is agreeable to call the [facility] any worsening of symptoms.  She continues to be compliant with buprenorphine and medication assisted treatment.  Patient is aware to call 911 or go to the nearest ER should begin having SI/HI. RTC 4 weeks.

## 2019-02-18 ENCOUNTER — OFFICE VISIT (OUTPATIENT)
Dept: PSYCHIATRY | Facility: CLINIC | Age: 39
End: 2019-02-18

## 2019-02-18 VITALS
HEIGHT: 64 IN | DIASTOLIC BLOOD PRESSURE: 79 MMHG | WEIGHT: 162 LBS | HEART RATE: 101 BPM | SYSTOLIC BLOOD PRESSURE: 138 MMHG | BODY MASS INDEX: 27.66 KG/M2

## 2019-02-18 DIAGNOSIS — F11.20 UNCOMPLICATED OPIOID DEPENDENCE (HCC): ICD-10-CM

## 2019-02-18 DIAGNOSIS — F33.1 MODERATE EPISODE OF RECURRENT MAJOR DEPRESSIVE DISORDER (HCC): ICD-10-CM

## 2019-02-18 DIAGNOSIS — F41.0 PANIC DISORDER: ICD-10-CM

## 2019-02-18 DIAGNOSIS — F11.20 OPIOID DEPENDENCE ON AGONIST THERAPY (HCC): Primary | ICD-10-CM

## 2019-02-18 PROCEDURE — 99214 OFFICE O/P EST MOD 30 MIN: CPT | Performed by: NURSE PRACTITIONER

## 2019-02-18 NOTE — PROGRESS NOTES
Subjective   Adina Thomas is a 38 y.o. female is here today for medication management follow-up.    Chief Complaint recheck on buprenorphine treatment  History of Present Illness:Pt presents for follow up at the [facility]. She has began accutane therapy for last 2 weeks.  She is doing well with this. She denies any changes with her depression.  Pt currently rates depression a 2-3/10 with 10 being worse.  Rates anxiety a 3/10.  Denies any suicidal thoughts, homicidal thoughts, or any a/V hallucinations. No current medical stressors.  Body mass index is 27.81 kg/m². no appetite changes.  Sleeping well at 8 hours a night. Denies any cravings. Continues to have stressors regarding her daughter having depression.  She is not having any panic attacks.  She rarely takes an atarax. No negative side effects to the meds.         The following portions of the patient's history were reviewed and updated as appropriate: allergies, current medications, past medical history, past social history, past surgical history and problem list.    Review of Systems   Constitutional: Negative for activity change, appetite change and fatigue.   HENT: Negative for ear pain.    Eyes: Negative for visual disturbance.   Respiratory: Negative.    Cardiovascular: Negative.    Gastrointestinal: Negative for nausea.   Endocrine: Negative.    Genitourinary: Negative.    Musculoskeletal: Negative for arthralgias.   Skin: Negative.    Allergic/Immunologic: Negative.    Neurological: Negative for dizziness, seizures and headaches.   Hematological: Negative.    Psychiatric/Behavioral: Negative for agitation, behavioral problems, confusion, decreased concentration, dysphoric mood, hallucinations, self-injury, sleep disturbance and suicidal ideas. The patient is nervous/anxious. The patient is not hyperactive.        Objective   Physical Exam   Constitutional: She is oriented to person, place, and time. She appears well-developed and  "well-nourished.   HENT:   Head: Normocephalic and atraumatic.   Neurological: She is alert and oriented to person, place, and time.   Psychiatric: She has a normal mood and affect. Her speech is normal and behavior is normal. Judgment and thought content normal. Cognition and memory are normal.   Vitals reviewed.    Blood pressure 138/79, pulse 101, height 162.6 cm (64\"), weight 73.5 kg (162 lb).    Medication List:   Current Outpatient Medications   Medication Sig Dispense Refill   • aspirin  MG tablet Take 325 mg by mouth Daily.  0   • atenolol (TENORMIN) 25 MG tablet Take 12.5 mg by mouth Daily.     • Buprenorphine HCl-Naloxone HCl 11.4-2.9 MG sublingual tablet Place 11.4 mg under the tongue Daily. 29 tablet 0   • CALTRATE 600+D 600-800 MG-UNIT tablet      • hydrOXYzine (ATARAX) 10 MG tablet Take 1 tablet by mouth 3 (Three) Times a Day As Needed for Anxiety. 90 tablet 0   • ibuprofen (ADVIL,MOTRIN) 600 MG tablet Take 600 mg by mouth Every 6 (Six) Hours As Needed for mild pain (1-3).     • isosorbide mononitrate (IMDUR) 60 MG 24 hr tablet Take 60 mg by mouth Daily.  5   • levothyroxine (SYNTHROID, LEVOTHROID) 100 MCG tablet Take 100 mcg by mouth Daily.  2   • metoclopramide (REGLAN) 10 MG tablet   2   • omeprazole (PriLOSEC) 40 MG capsule Take 40 mg by mouth Daily.     • ondansetron (ZOFRAN) 8 MG tablet Take 8 mg by mouth Every 8 (Eight) Hours As Needed.     • PARoxetine (PAXIL) 10 MG tablet Take 1 tablet by mouth Every Morning. 30 tablet 2   • TRAVEL SICKNESS 25 MG chewable tablet chewable tablet As Needed.  0   • vitamin B-12 (CYANOCOBALAMIN) 1000 MCG tablet Take 1,000 mcg by mouth Daily.  2   • vitamin D (ERGOCALCIFEROL) 30022 units capsule capsule Take 50,000 Units by mouth Every 7 (Seven) Days.       No current facility-administered medications for this visit.        Mental Status Exam:   Hygiene:   good  Cooperation:  Cooperative  Eye Contact:  Good  Psychomotor Behavior:  Appropriate  Affect:  Full " range  Hopelessness: Denies  Speech:  Normal  Thought Process:  Goal directed  Thought Content:  Normal  Suicidal:  None  Homicidal:  None  Hallucinations:  None  Delusion:  None  Memory:  Intact  Orientation:  Person, Place, Time and Situation  Reliability:  good  Insight:  Good  Judgement:  Good  Impulse Control:  Fair  Physical/Medical Issues:  Yes hypothyroidism    Assessment/Plan   Problems Addressed this Visit     None      Visit Diagnoses     Opioid dependence on agonist therapy (CMS/HCC)    -  Primary    Moderate episode of recurrent major depressive disorder (CMS/HCC)        Panic disorder        Uncomplicated opioid dependence (CMS/HCC)        Relevant Medications    Buprenorphine HCl-Naloxone HCl 11.4-2.9 MG sublingual tablet        Functionality: pt having minimal impairment in important areas of daily functioning.  Prognosis:  Guarded dependent on medication/follow up and treatment plan compliance.  lAexx reviewed and appropriate.  UDS performed today and is pending.  Previous ones reviewed and appropriate.   Patient has signed a new buprenorphine contract with me.  We discussed the plan of treatment.  I am going to start slow taper of the med at her next visit.  I discussed with her that she has a stable home environment.  She is not having cravings.  Pt is very nervous about doing this.  I have recommended at her next therapy appointment on 2/26 she discuss with her therapist and be seen more frequent than monthly.  Pt needs to address self esteem issues with her therapist.  I reassured her that this will be a slow taper and the goal is to get the suboxone dosage down to 2-4mg. I am not starting the taper today as pt is newly on accutane and I want to assess her depression status after she has taken the med longer.  I also want her to be prepared mentally as well as prepared with her therapist.   Last therapy appointment at [facility] was 1/30/19.  NA meetings attended on 1/25, 1/29/, 2/1/, 2/5, 2/9, 2/12,  2/16. .  She has brought in documentation of her counselor appointment at [facility] and her N/A meetings and this has been scanned into epic.  She is to continue her paxil and atarax at current dosage. She has refills on both of those meds. Patient is agreeable to call the [facility] any worsening of symptoms.  She continues to be compliant with buprenorphine and medication assisted treatment.  Patient is aware to call 911 or go to the nearest ER should begin having SI/HI. RTC 4 weeks.

## 2019-03-06 ENCOUNTER — TELEPHONE (OUTPATIENT)
Dept: PSYCHIATRY | Facility: CLINIC | Age: 39
End: 2019-03-06

## 2019-03-13 ENCOUNTER — TELEPHONE (OUTPATIENT)
Dept: PSYCHIATRY | Facility: CLINIC | Age: 39
End: 2019-03-13

## 2019-03-14 NOTE — TELEPHONE ENCOUNTER
I spoke with Patient and made her aware that Shereen said it was Ok this time due to her been away with her Family not to come in for a UDS and pill count , that she would see her on Tuesday at her follow up laura .   
Message   Received: Today   Message Contents   Shereen Raymond, KARMEN Villeda, Dania Etienne             Please call patient in for a random UDS and pill count         I called Patient no answer left voicemail she returned my call at 8:21 am I made patient aware that provider is requesting her come in the office today for a random UDS and pill count , Patient states she is in [place]   with her family for spring break and she has no car because she rode with Grandmother   
WDL

## 2019-03-19 ENCOUNTER — OFFICE VISIT (OUTPATIENT)
Dept: PSYCHIATRY | Facility: CLINIC | Age: 39
End: 2019-03-19

## 2019-03-19 VITALS
HEART RATE: 76 BPM | DIASTOLIC BLOOD PRESSURE: 76 MMHG | BODY MASS INDEX: 28.13 KG/M2 | HEIGHT: 64 IN | WEIGHT: 164.8 LBS | SYSTOLIC BLOOD PRESSURE: 124 MMHG

## 2019-03-19 DIAGNOSIS — F11.20 OPIOID DEPENDENCE ON AGONIST THERAPY (HCC): Primary | ICD-10-CM

## 2019-03-19 DIAGNOSIS — F11.20 UNCOMPLICATED OPIOID DEPENDENCE (HCC): ICD-10-CM

## 2019-03-19 DIAGNOSIS — F33.1 MODERATE EPISODE OF RECURRENT MAJOR DEPRESSIVE DISORDER (HCC): ICD-10-CM

## 2019-03-19 DIAGNOSIS — F41.0 PANIC DISORDER: ICD-10-CM

## 2019-03-19 PROCEDURE — 99214 OFFICE O/P EST MOD 30 MIN: CPT | Performed by: NURSE PRACTITIONER

## 2019-03-19 RX ORDER — PAROXETINE 10 MG/1
10 TABLET, FILM COATED ORAL EVERY MORNING
Qty: 30 TABLET | Refills: 2 | Status: SHIPPED | OUTPATIENT
Start: 2019-03-19 | End: 2019-06-10 | Stop reason: SDUPTHER

## 2019-03-19 NOTE — PROGRESS NOTES
"      Subjective   Adina Thomas is a 38 y.o. female is here today for medication management follow-up.    Chief Complaint recheck on buprenorphine treatment  History of Present Illness:Pt presents for follow up at the [facility]. She states she continues to do well.  . She denies any changes with her depression.  Pt currently rates depression a 2-3/10 with 10 being worse.  Rates anxiety a 3/10.  Denies any suicidal thoughts, homicidal thoughts, or any a/V hallucinations. No current medical stressors.  Body mass index is 28.29 kg/m². no appetite changes.  Sleeping well at 6 hours a night. Denies any cravings. Things are \"back and forth\" with situation with daughter. She is not having any panic attacks.  She rarely takes an atarax. No negative side effects to the meds.  Still on accutane and tolerating it without difficulty. She is a little more anxious today in that she knows we are beginning medication taper. She is very proud of herself but does not have good self esteem and is worried.  She has been in MAT treatement without relapse for 3 years.        The following portions of the patient's history were reviewed and updated as appropriate: allergies, current medications, past medical history, past social history, past surgical history and problem list.    Review of Systems   Constitutional: Negative for activity change, appetite change and fatigue.   HENT: Negative for ear pain.    Eyes: Negative for visual disturbance.   Respiratory: Negative.    Cardiovascular: Negative.    Gastrointestinal: Negative for nausea.   Endocrine: Negative.    Genitourinary: Negative.    Musculoskeletal: Negative for arthralgias.   Skin: Negative.    Allergic/Immunologic: Negative.    Neurological: Negative for dizziness, seizures and headaches.   Hematological: Negative.    Psychiatric/Behavioral: Negative for agitation, behavioral problems, confusion, decreased concentration, dysphoric mood, hallucinations, " "self-injury, sleep disturbance and suicidal ideas. The patient is nervous/anxious. The patient is not hyperactive.        Objective   Physical Exam   Constitutional: She is oriented to person, place, and time. She appears well-developed and well-nourished.   HENT:   Head: Normocephalic and atraumatic.   Neurological: She is alert and oriented to person, place, and time.   Psychiatric: She has a normal mood and affect. Her speech is normal and behavior is normal. Judgment and thought content normal. Cognition and memory are normal.   Vitals reviewed.    Blood pressure 124/76, pulse 76, height 162.6 cm (64\"), weight 74.8 kg (164 lb 12.8 oz).    Medication List:   Current Outpatient Medications   Medication Sig Dispense Refill   • aspirin  MG tablet Take 325 mg by mouth Daily.  0   • atenolol (TENORMIN) 25 MG tablet Take 12.5 mg by mouth Daily.     • Buprenorphine HCl-Naloxone HCl 8.6-2.1 MG sublingual tablet Place 8.6 tablets under the tongue Daily. 28 tablet 0   • CALTRATE 600+D 600-800 MG-UNIT tablet      • hydrOXYzine (ATARAX) 10 MG tablet Take 1 tablet by mouth 3 (Three) Times a Day As Needed for Anxiety. 90 tablet 0   • ibuprofen (ADVIL,MOTRIN) 600 MG tablet Take 600 mg by mouth Every 6 (Six) Hours As Needed for mild pain (1-3).     • isosorbide mononitrate (IMDUR) 60 MG 24 hr tablet Take 60 mg by mouth Daily.  5   • levothyroxine (SYNTHROID, LEVOTHROID) 100 MCG tablet Take 100 mcg by mouth Daily.  2   • metoclopramide (REGLAN) 10 MG tablet   2   • omeprazole (PriLOSEC) 40 MG capsule Take 40 mg by mouth Daily.     • ondansetron (ZOFRAN) 8 MG tablet Take 8 mg by mouth Every 8 (Eight) Hours As Needed.     • PARoxetine (PAXIL) 10 MG tablet Take 1 tablet by mouth Every Morning. 30 tablet 2   • TRAVEL SICKNESS 25 MG chewable tablet chewable tablet As Needed.  0   • vitamin B-12 (CYANOCOBALAMIN) 1000 MCG tablet Take 1,000 mcg by mouth Daily.  2   • vitamin D (ERGOCALCIFEROL) 96360 units capsule capsule Take " 50,000 Units by mouth Every 7 (Seven) Days.       No current facility-administered medications for this visit.        Mental Status Exam:   Hygiene:   good  Cooperation:  Cooperative  Eye Contact:  Good  Psychomotor Behavior:  Appropriate  Affect:  Full range  Hopelessness: Denies  Speech:  Normal  Thought Process:  Goal directed  Thought Content:  Normal  Suicidal:  None  Homicidal:  None  Hallucinations:  None  Delusion:  None  Memory:  Intact  Orientation:  Person, Place, Time and Situation  Reliability:  good  Insight:  Good  Judgement:  Good  Impulse Control:  Fair  Physical/Medical Issues:  Yes hypothyroidism    Assessment/Plan   Problems Addressed this Visit     None      Visit Diagnoses     Opioid dependence on agonist therapy (CMS/HCC)    -  Primary    Relevant Medications    Buprenorphine HCl-Naloxone HCl 8.6-2.1 MG sublingual tablet    Moderate episode of recurrent major depressive disorder (CMS/HCC)        Relevant Medications    PARoxetine (PAXIL) 10 MG tablet    Panic disorder        Relevant Medications    PARoxetine (PAXIL) 10 MG tablet    Uncomplicated opioid dependence (CMS/HCC)        Relevant Medications    Buprenorphine HCl-Naloxone HCl 8.6-2.1 MG sublingual tablet        Functionality: pt having minimal impairment in important areas of daily functioning.  Prognosis:  Guarded dependent on medication/follow up and treatment plan compliance.  Alexx reviewed and appropriate.  UDS performed today and is pending.  Previous ones reviewed and appropriate.   We are beginning buprenorphine taper today.  Had very lengthy discussion regarding this.  Pt is ready.  Plan is to get her on 8.6 mg see how she does.  Continuing efforts to promote the therapeutic alliance, address the patient's issues, and strengthen self awareness, insights, and coping skills.  Motivational interviewing was performed.  Pt is very proud of herself for this step.      Pt has brought in documentation of therapy appointment at [facility]  on 3/15/19. She has attended 7 NA meetings since last visit. These have been scanned into epic. She has also scheduled more frequent appointments with her therapist with the decrease in the buprenorphine dosage.  She will be seeing therapist biweekly and says she can make it weekly if she feels she needs it. She continues to have the Mirena.   She is to continue her paxil and atarax at current dosage. She has refills on both of those meds. Patient is agreeable to call the [facility] any worsening of symptoms.  She continues to be compliant with buprenorphine and medication assisted treatment.  Patient is aware to call 911 or go to the nearest ER should begin having SI/HI. RTC 4 weeks.

## 2019-04-01 ENCOUNTER — CLINICAL SUPPORT (OUTPATIENT)
Dept: FAMILY MEDICINE CLINIC | Facility: CLINIC | Age: 39
End: 2019-04-01

## 2019-04-01 ENCOUNTER — TELEPHONE (OUTPATIENT)
Dept: PSYCHIATRY | Facility: CLINIC | Age: 39
End: 2019-04-01

## 2019-04-01 NOTE — PROGRESS NOTES
Patient came by office and gave urine sample for UDS and brought suboxone tabs for pill count.  Cherie Buchanan, and Sridevi counted medication. Patient has 13 tabs. Pt was 2 short. Documentation given to Roxann

## 2019-04-01 NOTE — TELEPHONE ENCOUNTER
Patient wanted to make you aware that since tapering her Buprenorphine she has had diarrhea, Cold sweats and hot flashes she states that her anxiety  Is really bad and her heart rate is going crazy .

## 2019-04-01 NOTE — TELEPHONE ENCOUNTER
Per Provider's request Patient was called at 9:20 on 4/1/19 for a Random UDS and Pill count . Patient was made aware that she needs to be here by 4:30 pm.  Patient verbally Understood .

## 2019-04-08 ENCOUNTER — TELEPHONE (OUTPATIENT)
Dept: PSYCHIATRY | Facility: CLINIC | Age: 39
End: 2019-04-08

## 2019-04-08 NOTE — TELEPHONE ENCOUNTER
Patient Called the office this morning stating that she is worried she is torn all to pieces , Patient states that's she is having withdrawals,  she has diarrhea terrible , Back Pain , Her heart is racing , sick to her Stomach , Cold Sweats and hot sweats . She states she is in Depression and having cravings.  Patient states that she is just wanting  to make you aware of this , she also states that she did call her heart doctor due to her heart racing .  Patient also states that her therapist has canceled on her and she has no one to talk to.

## 2019-04-15 ENCOUNTER — OFFICE VISIT (OUTPATIENT)
Dept: PSYCHIATRY | Facility: CLINIC | Age: 39
End: 2019-04-15

## 2019-04-15 VITALS
SYSTOLIC BLOOD PRESSURE: 136 MMHG | HEIGHT: 64 IN | WEIGHT: 165 LBS | HEART RATE: 81 BPM | DIASTOLIC BLOOD PRESSURE: 80 MMHG | BODY MASS INDEX: 28.17 KG/M2

## 2019-04-15 DIAGNOSIS — F41.0 PANIC DISORDER: ICD-10-CM

## 2019-04-15 DIAGNOSIS — F11.20 UNCOMPLICATED OPIOID DEPENDENCE (HCC): ICD-10-CM

## 2019-04-15 DIAGNOSIS — F11.20 OPIOID DEPENDENCE ON AGONIST THERAPY (HCC): Primary | ICD-10-CM

## 2019-04-15 DIAGNOSIS — F33.1 MODERATE EPISODE OF RECURRENT MAJOR DEPRESSIVE DISORDER (HCC): ICD-10-CM

## 2019-04-15 PROCEDURE — 99214 OFFICE O/P EST MOD 30 MIN: CPT | Performed by: NURSE PRACTITIONER

## 2019-04-15 NOTE — PROGRESS NOTES
Subjective   Adina Thomas is a 38 y.o. female is here today for medication management follow-up.    Chief Complaint recheck on buprenorphine treatment  History of Present Illness:Pt presents for follow up at the [facility]. Pt had called in during the taper and had the following symptoms: increased anxiety, insomnia, diarrhea, night sweats, body aches and increased cravings.  States when I sent in the extra 1.4mg it really helped. Pt currently rates depression a 2/10 with 10 being worse.  Rates anxiety a 3/10.  Denies any suicidal thoughts, homicidal thoughts, or any a/V hallucinations. Denies any cravings.  Body mass index is 28.32 kg/m². no appetite changes.  Her mood is stable.  No medical stressors.  She denies any panic attacks.  On the accutane and denies any negative side effects from it.           The following portions of the patient's history were reviewed and updated as appropriate: allergies, current medications, past medical history, past social history, past surgical history and problem list.    Review of Systems   Constitutional: Negative for activity change, appetite change and fatigue.   HENT: Negative for ear pain.    Eyes: Negative for visual disturbance.   Respiratory: Negative.    Cardiovascular: Negative.    Gastrointestinal: Negative for nausea.   Endocrine: Negative.    Genitourinary: Negative.    Musculoskeletal: Negative for arthralgias.   Skin: Negative.    Allergic/Immunologic: Negative.    Neurological: Negative for dizziness, seizures and headaches.   Hematological: Negative.    Psychiatric/Behavioral: Negative for agitation, behavioral problems, confusion, decreased concentration, dysphoric mood, hallucinations, self-injury, sleep disturbance and suicidal ideas. The patient is nervous/anxious. The patient is not hyperactive.        Objective   Physical Exam   Constitutional: She is oriented to person, place, and time. She appears well-developed and  "well-nourished.   HENT:   Head: Normocephalic and atraumatic.   Neurological: She is alert and oriented to person, place, and time.   Psychiatric: She has a normal mood and affect. Her speech is normal and behavior is normal. Judgment and thought content normal. Cognition and memory are normal.   Vitals reviewed.    Blood pressure 136/80, pulse 81, height 162.6 cm (64\"), weight 74.8 kg (165 lb).    Medication List:   Current Outpatient Medications   Medication Sig Dispense Refill   • aspirin  MG tablet Take 325 mg by mouth Daily.  0   • atenolol (TENORMIN) 25 MG tablet Take 12.5 mg by mouth Daily.     • Buprenorphine HCl-Naloxone HCl 1.4-0.36 MG sublingual tablet Place 1.4 mg under the tongue Daily. To be taken along with the 8.6mg dosing for total of 10mg 28 tablet 0   • Buprenorphine HCl-Naloxone HCl 8.6-2.1 MG sublingual tablet Place 1 tablet under the tongue Daily. To be taken along with the 1.4mg for total of 10mg 28 tablet 0   • CALTRATE 600+D 600-800 MG-UNIT tablet      • hydrOXYzine (ATARAX) 10 MG tablet Take 1 tablet by mouth 3 (Three) Times a Day As Needed for Anxiety. 90 tablet 0   • ibuprofen (ADVIL,MOTRIN) 600 MG tablet Take 600 mg by mouth Every 6 (Six) Hours As Needed for mild pain (1-3).     • isosorbide mononitrate (IMDUR) 60 MG 24 hr tablet Take 60 mg by mouth Daily.  5   • levothyroxine (SYNTHROID, LEVOTHROID) 100 MCG tablet Take 100 mcg by mouth Daily.  2   • metoclopramide (REGLAN) 10 MG tablet   2   • omeprazole (PriLOSEC) 40 MG capsule Take 40 mg by mouth Daily.     • ondansetron (ZOFRAN) 8 MG tablet Take 8 mg by mouth Every 8 (Eight) Hours As Needed.     • PARoxetine (PAXIL) 10 MG tablet Take 1 tablet by mouth Every Morning. 30 tablet 2   • TRAVEL SICKNESS 25 MG chewable tablet chewable tablet As Needed.  0   • vitamin B-12 (CYANOCOBALAMIN) 1000 MCG tablet Take 1,000 mcg by mouth Daily.  2   • vitamin D (ERGOCALCIFEROL) 58622 units capsule capsule Take 50,000 Units by mouth Every 7 " (Seven) Days.       No current facility-administered medications for this visit.        Mental Status Exam:   Hygiene:   good  Cooperation:  Cooperative  Eye Contact:  Good  Psychomotor Behavior:  Appropriate  Affect:  Full range  Hopelessness: Denies  Speech:  Normal  Thought Process:  Goal directed  Thought Content:  Normal  Suicidal:  None  Homicidal:  None  Hallucinations:  None  Delusion:  None  Memory:  Intact  Orientation:  Person, Place, Time and Situation  Reliability:  good  Insight:  Good  Judgement:  Good  Impulse Control:  Fair  Physical/Medical Issues:  Yes hypothyroidism    Assessment/Plan   Problems Addressed this Visit     None      Visit Diagnoses     Opioid dependence on agonist therapy (CMS/HCC)    -  Primary    Relevant Medications    Buprenorphine HCl-Naloxone HCl 8.6-2.1 MG sublingual tablet    Moderate episode of recurrent major depressive disorder (CMS/HCC)        Panic disorder        Uncomplicated opioid dependence (CMS/HCC)        Relevant Medications    Buprenorphine HCl-Naloxone HCl 8.6-2.1 MG sublingual tablet        Functionality: pt having minimal impairment in important areas of daily functioning.  Prognosis:  Guarded dependent on medication/follow up and treatment plan compliance.  Alexx reviewed and appropriate.  UDS performed today and is pending.  Previous ones reviewed and appropriate. Random UDS also performed and pending.    Pt has brought in documentation of her therapy appointment as well as 4 NA meetings.  On a release for [facility] in order to obtain the notes from her therapy appointments to ensure that she is compliant.  Patient had been on 11.4 mg of buprenorphine for 4 years so decreasing down to 10 mg is a positive step for her.  The goal is to get her dose of buprenorphine down to around 4-6 mg and we did discuss that this may be a very lengthy process.  I am willing to do this slowly as I believe this will increase patient's chances of  continuing the taper as she seems to panic when she gets side effects from any type of withdrawal.  He verbalizes the continued importance of maintaining sobriety.    Reason for long term treatment: Pt continues to be a high risk for relapse as she panics and has cravings with decreased suboxone dosage.  Will continue her at 10mg total for now.  When we decide to taper again will decrease from 10mg to 8.6.  This was discussed.  For now continue same dose and NA meetings along with therapy. Continuing efforts to promote the therapeutic alliance, address the patient's issues, and strengthen self awareness, insights, and coping skills.  Motivational interviewing performed in which pt was praised as she had been on same dose of suboxone for 4 years.   Patient is agreeable to call the [facility] any worsening of symptoms.  She continues to be compliant with buprenorphine and medication assisted treatment.  Patient is aware to call 911 or go to the nearest ER should begin having SI/HI. RTC 4 weeks.

## 2019-05-10 ENCOUNTER — TELEPHONE (OUTPATIENT)
Dept: PSYCHIATRY | Facility: CLINIC | Age: 39
End: 2019-05-10

## 2019-05-10 NOTE — TELEPHONE ENCOUNTER
Patient called she said she didn't realize her laura was till later in the day on Monday , patient states that she usually takes her medication in the am and with her laura been later in the day she wont have any to take that morning she was wondering if you could call her in a dose for that morning .

## 2019-05-10 NOTE — TELEPHONE ENCOUNTER
Her appointment was same time last visit.  Suboxone withdrawal typiclally does not start for 2-3 days so a few hours will be fine she should not experience any physical withdrawal

## 2019-05-13 ENCOUNTER — OFFICE VISIT (OUTPATIENT)
Dept: PSYCHIATRY | Facility: CLINIC | Age: 39
End: 2019-05-13

## 2019-05-13 VITALS
HEIGHT: 64 IN | SYSTOLIC BLOOD PRESSURE: 128 MMHG | BODY MASS INDEX: 26.46 KG/M2 | HEART RATE: 88 BPM | WEIGHT: 155 LBS | DIASTOLIC BLOOD PRESSURE: 64 MMHG

## 2019-05-13 DIAGNOSIS — F11.20 UNCOMPLICATED OPIOID DEPENDENCE (HCC): ICD-10-CM

## 2019-05-13 DIAGNOSIS — F11.20 OPIOID DEPENDENCE ON AGONIST THERAPY (HCC): Primary | ICD-10-CM

## 2019-05-13 DIAGNOSIS — F41.0 PANIC DISORDER: ICD-10-CM

## 2019-05-13 DIAGNOSIS — F33.1 MODERATE EPISODE OF RECURRENT MAJOR DEPRESSIVE DISORDER (HCC): ICD-10-CM

## 2019-05-13 PROCEDURE — 90833 PSYTX W PT W E/M 30 MIN: CPT | Performed by: NURSE PRACTITIONER

## 2019-05-13 PROCEDURE — 99214 OFFICE O/P EST MOD 30 MIN: CPT | Performed by: NURSE PRACTITIONER

## 2019-05-13 NOTE — PROGRESS NOTES
Subjective   Adina Thomas is a 38 y.o. female is here today for medication management follow-up.    Chief Complaint recheck on buprenorphine treatment  History of Present Illness:Pt presents for follow up at the [facility].  Pt states she is overall doing well but had a bad Mother's day yesterday.  Had some issues with her mother and them not getting along.  She is coping with it.  Denies any depression or anxiety.  Denies any suicidal thoughts, homicidal thoughts, or any a/v hallucinations.  No panic attacks.  No cravings.  Sleeping well at least 8 hours a night.  Mood is stable.  No negative side effects to the meds.  Body mass index is 26.61 kg/m². Weight loss 10 lbs since last visit.  Pt is watching caloric intake.  No medical stressors.                 The following portions of the patient's history were reviewed and updated as appropriate: allergies, current medications, past medical history, past social history, past surgical history and problem list.    Review of Systems   Constitutional: Negative for activity change, appetite change and fatigue.   HENT: Negative for ear pain.    Eyes: Negative for visual disturbance.   Respiratory: Negative.    Cardiovascular: Negative.    Gastrointestinal: Negative for nausea.   Endocrine: Negative.    Genitourinary: Negative.    Musculoskeletal: Negative for arthralgias.   Skin: Negative.    Allergic/Immunologic: Negative.    Neurological: Negative for dizziness, seizures and headaches.   Hematological: Negative.    Psychiatric/Behavioral: Negative for agitation, behavioral problems, confusion, decreased concentration, dysphoric mood, hallucinations, self-injury, sleep disturbance and suicidal ideas. The patient is nervous/anxious. The patient is not hyperactive.        Objective   Physical Exam   Constitutional: She is oriented to person, place, and time. She appears well-developed and well-nourished.   HENT:   Head: Normocephalic and atraumatic.  "  Neurological: She is alert and oriented to person, place, and time.   Psychiatric: She has a normal mood and affect. Her speech is normal and behavior is normal. Judgment and thought content normal. Cognition and memory are normal.   Vitals reviewed.    Blood pressure 128/64, pulse 88, height 162.6 cm (64\"), weight 70.3 kg (155 lb).    Medication List:   Current Outpatient Medications   Medication Sig Dispense Refill   • aspirin  MG tablet Take 325 mg by mouth Daily.  0   • atenolol (TENORMIN) 25 MG tablet Take 12.5 mg by mouth Daily.     • Buprenorphine HCl-Naloxone HCl 1.4-0.36 MG sublingual tablet Place 1.4 mg under the tongue Daily. To be taken along with the 8.6mg dosing for total of 10mg 28 tablet 0   • Buprenorphine HCl-Naloxone HCl 8.6-2.1 MG sublingual tablet Place 1 tablet under the tongue Daily. To be taken along with the 1.4mg for total of 10mg 28 tablet 0   • CALTRATE 600+D 600-800 MG-UNIT tablet      • hydrOXYzine (ATARAX) 10 MG tablet Take 1 tablet by mouth 3 (Three) Times a Day As Needed for Anxiety. 90 tablet 0   • ibuprofen (ADVIL,MOTRIN) 600 MG tablet Take 600 mg by mouth Every 6 (Six) Hours As Needed for mild pain (1-3).     • isosorbide mononitrate (IMDUR) 60 MG 24 hr tablet Take 60 mg by mouth Daily.  5   • levothyroxine (SYNTHROID, LEVOTHROID) 100 MCG tablet Take 100 mcg by mouth Daily.  2   • metoclopramide (REGLAN) 10 MG tablet   2   • omeprazole (PriLOSEC) 40 MG capsule Take 40 mg by mouth Daily.     • ondansetron (ZOFRAN) 8 MG tablet Take 8 mg by mouth Every 8 (Eight) Hours As Needed.     • PARoxetine (PAXIL) 10 MG tablet Take 1 tablet by mouth Every Morning. 30 tablet 2   • TRAVEL SICKNESS 25 MG chewable tablet chewable tablet As Needed.  0   • vitamin B-12 (CYANOCOBALAMIN) 1000 MCG tablet Take 1,000 mcg by mouth Daily.  2   • vitamin D (ERGOCALCIFEROL) 80221 units capsule capsule Take 50,000 Units by mouth Every 7 (Seven) Days.       No current facility-administered medications " for this visit.        Mental Status Exam:   Hygiene:   good  Cooperation:  Cooperative  Eye Contact:  Good  Psychomotor Behavior:  Appropriate  Affect:  Full range  Hopelessness: Denies  Speech:  Normal  Thought Process:  Goal directed  Thought Content:  Normal  Suicidal:  None  Homicidal:  None  Hallucinations:  None  Delusion:  None  Memory:  Intact  Orientation:  Person, Place, Time and Situation  Reliability:  good  Insight:  Good  Judgement:  Good  Impulse Control:  Fair  Physical/Medical Issues:  Yes hypothyroidism    Assessment/Plan   Problems Addressed this Visit     None      Visit Diagnoses     Opioid dependence on agonist therapy (CMS/HCC)    -  Primary    Relevant Medications    Buprenorphine HCl-Naloxone HCl 8.6-2.1 MG sublingual tablet    Moderate episode of recurrent major depressive disorder (CMS/HCC)        Panic disorder        Uncomplicated opioid dependence (CMS/HCC)        Relevant Medications    Buprenorphine HCl-Naloxone HCl 8.6-2.1 MG sublingual tablet        Functionality: pt having minimal impairment in important areas of daily functioning.  Prognosis:  Guarded dependent on medication/follow up and treatment plan compliance.  Alexx reviewed and appropriate.  UDS performed today and is pending.  Previous ones reviewed and appropriate.    Time in 12:20 time out 1:10.  Over 30 minutes spent in psychotherapy discussing triggers and coping mechanisms.  Motivational interviewing in how far pt has come and continual praise over her sobriety was given.    Pt has brought in documentation of her therapy appointment at [facility] for the following dates:  5-8-19, 4-19-19 as well as 7 Na meetings.  She continues to verbalize importance of maintaining sobriety.  We discussed long term goal.  Next visit I am continuing taper down to 8.6mg.  She agrees with this.  I will keep her at that dose for some time.    Reason for long term treatment: Pt continues to be a high risk for relapse as she panics and  has cravings with decreased suboxone dosage.  Will continue her at 10mg total for now.  When we decide to taper again will decrease from 10mg to 8.6.  This was discussed.  For now continue same dose and NA meetings along with therapy. Continuing efforts to promote the therapeutic alliance, address the patient's issues, and strengthen self awareness, insights, and coping skills.  Motivational interviewing performed in which pt was praised as she had been on same dose of suboxone for 4 years.   Patient is agreeable to call the [facility] any worsening of symptoms.  She continues to be compliant with buprenorphine and medication assisted treatment.  Patient is aware to call 911 or go to the nearest ER should begin having SI/HI. RTC 4 weeks.

## 2019-06-10 ENCOUNTER — OFFICE VISIT (OUTPATIENT)
Dept: PSYCHIATRY | Facility: CLINIC | Age: 39
End: 2019-06-10

## 2019-06-10 VITALS
HEART RATE: 89 BPM | DIASTOLIC BLOOD PRESSURE: 76 MMHG | HEIGHT: 64 IN | WEIGHT: 167.4 LBS | SYSTOLIC BLOOD PRESSURE: 136 MMHG | BODY MASS INDEX: 28.58 KG/M2

## 2019-06-10 DIAGNOSIS — F11.20 UNCOMPLICATED OPIOID DEPENDENCE (HCC): ICD-10-CM

## 2019-06-10 DIAGNOSIS — F41.0 PANIC DISORDER: ICD-10-CM

## 2019-06-10 DIAGNOSIS — F33.1 MODERATE EPISODE OF RECURRENT MAJOR DEPRESSIVE DISORDER (HCC): ICD-10-CM

## 2019-06-10 DIAGNOSIS — F11.20 OPIOID DEPENDENCE ON AGONIST THERAPY (HCC): Primary | ICD-10-CM

## 2019-06-10 PROCEDURE — 99214 OFFICE O/P EST MOD 30 MIN: CPT | Performed by: NURSE PRACTITIONER

## 2019-06-10 RX ORDER — PAROXETINE 10 MG/1
10 TABLET, FILM COATED ORAL EVERY MORNING
Qty: 30 TABLET | Refills: 2 | Status: SHIPPED | OUTPATIENT
Start: 2019-06-10 | End: 2019-07-22 | Stop reason: SDUPTHER

## 2019-06-10 NOTE — PROGRESS NOTES
Subjective   Adina Thomas is a 38 y.o. female is here today for medication management follow-up.    Chief Complaint recheck on buprenorphine treatment  History of Present Illness:Pt presents for follow up at the [facility]. Pt continues to do well on current dosing.  She denies any cravings.  Denies any depression or anxiety.  Body mass index is 28.73 kg/m². no appetite changes.  No negative side effects to the meds.  She continues with accutane and states she is having some physical side effects of back pain and dry mouth with it and [facility] She is scheduled to go to [place] in 2 weeks and is very excited about it.  Continues to voice positive aspects to sobriety including stable life and relationships.            The following portions of the patient's history were reviewed and updated as appropriate: allergies, current medications, past medical history, past social history, past surgical history and problem list.    Review of Systems   Constitutional: Negative for activity change, appetite change and fatigue.   HENT: Negative for ear pain.    Eyes: Negative for visual disturbance.   Respiratory: Negative.    Cardiovascular: Negative.    Gastrointestinal: Negative for nausea.   Endocrine: Negative.    Genitourinary: Negative.    Musculoskeletal: Negative for arthralgias.   Skin: Negative.    Allergic/Immunologic: Negative.    Neurological: Negative for dizziness, seizures and headaches.   Hematological: Negative.    Psychiatric/Behavioral: Negative for agitation, behavioral problems, confusion, decreased concentration, dysphoric mood, hallucinations, self-injury, sleep disturbance and suicidal ideas. The patient is nervous/anxious. The patient is not hyperactive.        Objective   Physical Exam   Constitutional: She is oriented to person, place, and time. She appears well-developed and well-nourished.   HENT:   Head: Normocephalic and  "atraumatic.   Neurological: She is alert and oriented to person, place, and time.   Psychiatric: She has a normal mood and affect. Her speech is normal and behavior is normal. Judgment and thought content normal. Cognition and memory are normal.   Vitals reviewed.    Blood pressure 136/76, pulse 89, height 162.6 cm (64\"), weight 75.9 kg (167 lb 6.4 oz).    Medication List:   Current Outpatient Medications   Medication Sig Dispense Refill   • aspirin  MG tablet Take 325 mg by mouth Daily.  0   • atenolol (TENORMIN) 25 MG tablet Take 12.5 mg by mouth Daily.     • Buprenorphine HCl-Naloxone HCl 1.4-0.36 MG sublingual tablet Place 1.4 mg under the tongue Daily. To be taken along with the 8.6mg dosing for total of 10mg 42 tablet 0   • Buprenorphine HCl-Naloxone HCl 8.6-2.1 MG sublingual tablet Place 1 tablet under the tongue Daily. To be taken along with the 1.4mg for total of 10mg 42 tablet 0   • CALTRATE 600+D 600-800 MG-UNIT tablet      • hydrOXYzine (ATARAX) 10 MG tablet Take 1 tablet by mouth 3 (Three) Times a Day As Needed for Anxiety. 90 tablet 0   • ibuprofen (ADVIL,MOTRIN) 600 MG tablet Take 600 mg by mouth Every 6 (Six) Hours As Needed for mild pain (1-3).     • isosorbide mononitrate (IMDUR) 60 MG 24 hr tablet Take 60 mg by mouth Daily.  5   • levothyroxine (SYNTHROID, LEVOTHROID) 100 MCG tablet Take 100 mcg by mouth Daily.  2   • metoclopramide (REGLAN) 10 MG tablet   2   • omeprazole (PriLOSEC) 40 MG capsule Take 40 mg by mouth Daily.     • ondansetron (ZOFRAN) 8 MG tablet Take 8 mg by mouth Every 8 (Eight) Hours As Needed.     • PARoxetine (PAXIL) 10 MG tablet Take 1 tablet by mouth Every Morning. 30 tablet 2   • TRAVEL SICKNESS 25 MG chewable tablet chewable tablet As Needed.  0   • vitamin B-12 (CYANOCOBALAMIN) 1000 MCG tablet Take 1,000 mcg by mouth Daily.  2   • vitamin D (ERGOCALCIFEROL) 15032 units capsule capsule Take 50,000 Units by mouth Every 7 (Seven) Days.       No current " facility-administered medications for this visit.        Mental Status Exam:   Hygiene:   good  Cooperation:  Cooperative  Eye Contact:  Good  Psychomotor Behavior:  Appropriate  Affect:  Full range  Hopelessness: Denies  Speech:  Normal  Thought Process:  Goal directed  Thought Content:  Normal  Suicidal:  None  Homicidal:  None  Hallucinations:  None  Delusion:  None  Memory:  Intact  Orientation:  Person, Place, Time and Situation  Reliability:  good  Insight:  Good  Judgement:  Good  Impulse Control:  Fair  Physical/Medical Issues:  Yes hypothyroidism    Assessment/Plan   Problems Addressed this Visit     None      Visit Diagnoses     Opioid dependence on agonist therapy (CMS/HCC)    -  Primary    Relevant Medications    Buprenorphine HCl-Naloxone HCl 8.6-2.1 MG sublingual tablet    Moderate episode of recurrent major depressive disorder (CMS/HCC)        Relevant Medications    PARoxetine (PAXIL) 10 MG tablet    Panic disorder        Relevant Medications    PARoxetine (PAXIL) 10 MG tablet    Uncomplicated opioid dependence (CMS/HCC)        Relevant Medications    Buprenorphine HCl-Naloxone HCl 8.6-2.1 MG sublingual tablet    Buprenorphine HCl-Naloxone HCl 1.4-0.36 MG sublingual tablet        Functionality: pt having minimal impairment in important areas of daily functioning.  Prognosis:  Guarded dependent on medication/follow up and treatment plan compliance.  Alexx reviewed and appropriate.  UDS performed today and is pending.  Previous ones reviewed and appropriate.  We had lengthy discussion regarding taper today.  I am going to taper her at next visit.  At that time I will take her to 8.6mg.  I want her to enjoy her vacation and not worry about the taper as well as I would really like for her to be off the accutane when we do it.  She is in agreement with this plan.      Pt has brought in documentation of her therapy appointment at [facility] for the following dates: 6/5/19.  She has attended several   meetings and it has been scanned into epic.           Reason for long term treatment: Pt continues to be a high risk for relapse as she panics and has cravings with decreased suboxone dosage.  Will continue her at 10mg total for now.  When we decide to taper again will decrease from 10mg to 8.6.  This was discussed.  For now continue same dose and NA meetings along with therapy. Continuing efforts to promote the therapeutic alliance, address the patient's issues, and strengthen self awareness, insights, and coping skills. s.   Patient is agreeable to call the [facility] any worsening of symptoms.  She continues to be compliant with buprenorphine and medication assisted treatment.  Patient is aware to call 911 or go to the nearest ER should begin having SI/HI. RTC 6 weeks.

## 2019-07-22 ENCOUNTER — OFFICE VISIT (OUTPATIENT)
Dept: PSYCHIATRY | Facility: CLINIC | Age: 39
End: 2019-07-22

## 2019-07-22 VITALS
SYSTOLIC BLOOD PRESSURE: 128 MMHG | DIASTOLIC BLOOD PRESSURE: 82 MMHG | HEIGHT: 64 IN | HEART RATE: 76 BPM | WEIGHT: 166.2 LBS | BODY MASS INDEX: 28.38 KG/M2

## 2019-07-22 DIAGNOSIS — F41.0 PANIC DISORDER: ICD-10-CM

## 2019-07-22 DIAGNOSIS — F11.20 UNCOMPLICATED OPIOID DEPENDENCE (HCC): ICD-10-CM

## 2019-07-22 DIAGNOSIS — F33.1 MODERATE EPISODE OF RECURRENT MAJOR DEPRESSIVE DISORDER (HCC): ICD-10-CM

## 2019-07-22 DIAGNOSIS — F11.20 OPIOID DEPENDENCE ON AGONIST THERAPY (HCC): Primary | ICD-10-CM

## 2019-07-22 PROCEDURE — 99214 OFFICE O/P EST MOD 30 MIN: CPT | Performed by: NURSE PRACTITIONER

## 2019-07-22 RX ORDER — PAROXETINE 10 MG/1
10 TABLET, FILM COATED ORAL EVERY MORNING
Qty: 30 TABLET | Refills: 2 | Status: SHIPPED | OUTPATIENT
Start: 2019-07-22 | End: 2019-08-19 | Stop reason: SDUPTHER

## 2019-07-22 NOTE — PROGRESS NOTES
"      Subjective   Adinaclayton Thomas is a 39 y.o. female is here today for medication management follow-up.    Chief Complaint recheck on buprenorphine treatment  History of Present Illness: Presents to the [facility] for medication follow up.  She continues to do well.  She has had to decrease the dosage of accutane to half the dose and the therapy has been extended for another month.  She denies any depression.  Denies any suicidal thoughts.  Denies any cravings.  Sleeping well at least 8 hours a night.  Denies any panic attacks.  Continues with some anxiety.  Has some boundary issues with her grandmother who she claims tries to \"smother\" her.  Had a good vacation. Mood is stable.  No suicidal thoughts.     .            The following portions of the patient's history were reviewed and updated as appropriate: allergies, current medications, past medical history, past social history, past surgical history and problem list.    Review of Systems   Constitutional: Negative for activity change, appetite change and fatigue.   HENT: Negative for ear pain.    Eyes: Negative for visual disturbance.   Respiratory: Negative.    Cardiovascular: Negative.    Gastrointestinal: Negative for nausea.   Endocrine: Negative.    Genitourinary: Negative.    Musculoskeletal: Negative for arthralgias.   Skin: Negative.    Allergic/Immunologic: Negative.    Neurological: Negative for dizziness, seizures and headaches.   Hematological: Negative.    Psychiatric/Behavioral: Negative for agitation, behavioral problems, confusion, decreased concentration, dysphoric mood, hallucinations, self-injury, sleep disturbance and suicidal ideas. The patient is nervous/anxious. The patient is not hyperactive.        Objective   Physical Exam   Constitutional: She is oriented to person, place, and time. She appears well-developed and well-nourished.   HENT:   Head: Normocephalic and atraumatic.   Neurological: She is alert and oriented to " "person, place, and time.   Psychiatric: She has a normal mood and affect. Her speech is normal and behavior is normal. Judgment and thought content normal. Cognition and memory are normal.   Vitals reviewed.    Blood pressure 128/82, pulse 76, height 162.6 cm (64.02\"), weight 75.4 kg (166 lb 3.2 oz).    Medication List:   Current Outpatient Medications   Medication Sig Dispense Refill   • aspirin  MG tablet Take 325 mg by mouth Daily.  0   • atenolol (TENORMIN) 25 MG tablet Take 12.5 mg by mouth Daily.     • Buprenorphine HCl-Naloxone HCl 1.4-0.36 MG sublingual tablet Place 1.4 mg under the tongue Daily. To be taken along with the 8.6mg dosing for total of 10mg 28 tablet 0   • Buprenorphine HCl-Naloxone HCl 8.6-2.1 MG sublingual tablet Place 1 tablet under the tongue Daily. To be taken along with the 1.4mg for total of 10mg 28 tablet 0   • CALTRATE 600+D 600-800 MG-UNIT tablet      • hydrOXYzine (ATARAX) 10 MG tablet Take 1 tablet by mouth 3 (Three) Times a Day As Needed for Anxiety. 90 tablet 0   • ibuprofen (ADVIL,MOTRIN) 600 MG tablet Take 600 mg by mouth Every 6 (Six) Hours As Needed for mild pain (1-3).     • isosorbide mononitrate (IMDUR) 60 MG 24 hr tablet Take 60 mg by mouth Daily.  5   • levothyroxine (SYNTHROID, LEVOTHROID) 100 MCG tablet Take 100 mcg by mouth Daily.  2   • metoclopramide (REGLAN) 10 MG tablet   2   • omeprazole (PriLOSEC) 40 MG capsule Take 40 mg by mouth Daily.     • ondansetron (ZOFRAN) 8 MG tablet Take 8 mg by mouth Every 8 (Eight) Hours As Needed.     • PARoxetine (PAXIL) 10 MG tablet Take 1 tablet by mouth Every Morning. 30 tablet 2   • TRAVEL SICKNESS 25 MG chewable tablet chewable tablet As Needed.  0   • vitamin B-12 (CYANOCOBALAMIN) 1000 MCG tablet Take 1,000 mcg by mouth Daily.  2   • vitamin D (ERGOCALCIFEROL) 01984 units capsule capsule Take 50,000 Units by mouth Every 7 (Seven) Days.       No current facility-administered medications for this visit.        Mental Status " Exam:   Hygiene:   good  Cooperation:  Cooperative  Eye Contact:  Good  Psychomotor Behavior:  Appropriate  Affect:  Full range  Hopelessness: Denies  Speech:  Normal  Thought Process:  Goal directed  Thought Content:  Normal  Suicidal:  None  Homicidal:  None  Hallucinations:  None  Delusion:  None  Memory:  Intact  Orientation:  Person, Place, Time and Situation  Reliability:  good  Insight:  Good  Judgement:  Good  Impulse Control:  Fair  Physical/Medical Issues:  Yes hypothyroidism    Assessment/Plan   Problems Addressed this Visit     None      Visit Diagnoses     Opioid dependence on agonist therapy (CMS/HCC)    -  Primary    Relevant Medications    Buprenorphine HCl-Naloxone HCl 8.6-2.1 MG sublingual tablet    Moderate episode of recurrent major depressive disorder (CMS/HCC)        Relevant Medications    PARoxetine (PAXIL) 10 MG tablet    Panic disorder        Relevant Medications    PARoxetine (PAXIL) 10 MG tablet    Uncomplicated opioid dependence (CMS/HCC)        Relevant Medications    Buprenorphine HCl-Naloxone HCl 8.6-2.1 MG sublingual tablet    Buprenorphine HCl-Naloxone HCl 1.4-0.36 MG sublingual tablet        Functionality: pt having minimal impairment in important areas of daily functioning.  Prognosis:  Guarded dependent on medication/follow up and treatment plan compliance.  Alexx reviewed and appropriate.  UDS performed today and is pending.  Previous ones reviewed and appropriate.     We had originally planned to taper to 8.6mg today.  She now has another month of accutane treatment and she really wants to be off the accutane treatment when we taper.  We had a lengthy discussion.  She has really struggled with taper in the past.  I am continuing her at the current dosage for another month and will taper down to 8.6mg next visit.      Pt has brought in documentation of her therapy appointment at [facility] for the following dates: 6/13, 7/8, 7/18.   She has attended several NA meetings and it  has been scanned into epic.           Reason for long term treatment: Pt continues to be a high risk for relapse as she panics and has cravings with decreased suboxone dosage.  Will continue her at 10mg total for now.  When we decide to taper again will decrease from 10mg to 8.6.  This was discussed.  For now continue same dose and NA meetings along with therapy. Continuing efforts to promote the therapeutic alliance, address the patient's issues, and strengthen self awareness, insights, and coping skills. s.   Patient is agreeable to call the [facility] any worsening of symptoms.  She continues to be compliant with buprenorphine and medication assisted treatment.  Patient is aware to call 911 or go to the nearest ER should begin having SI/HI. RTC 4 weeks.

## 2019-07-24 ENCOUNTER — TELEPHONE (OUTPATIENT)
Dept: FAMILY MEDICINE CLINIC | Facility: CLINIC | Age: 39
End: 2019-07-24

## 2019-07-24 NOTE — TELEPHONE ENCOUNTER
Spoke with patient  coming in for random UDS and pill count       ----- Message from Dania Villeda sent at 7/24/2019  7:57 AM EDT -----      ----- Message -----  From: Shereen Raymond APRN  Sent: 7/24/2019   7:53 AM  To: Dania Villeda    please call in for random UDS and pill count

## 2019-08-19 ENCOUNTER — OFFICE VISIT (OUTPATIENT)
Dept: PSYCHIATRY | Facility: CLINIC | Age: 39
End: 2019-08-19

## 2019-08-19 VITALS
HEIGHT: 64 IN | WEIGHT: 166 LBS | DIASTOLIC BLOOD PRESSURE: 78 MMHG | SYSTOLIC BLOOD PRESSURE: 126 MMHG | HEART RATE: 82 BPM | BODY MASS INDEX: 28.34 KG/M2

## 2019-08-19 DIAGNOSIS — F41.0 PANIC DISORDER: ICD-10-CM

## 2019-08-19 DIAGNOSIS — F11.20 OPIOID DEPENDENCE ON AGONIST THERAPY (HCC): Primary | ICD-10-CM

## 2019-08-19 DIAGNOSIS — F33.1 MODERATE EPISODE OF RECURRENT MAJOR DEPRESSIVE DISORDER (HCC): ICD-10-CM

## 2019-08-19 DIAGNOSIS — F11.20 UNCOMPLICATED OPIOID DEPENDENCE (HCC): ICD-10-CM

## 2019-08-19 PROCEDURE — 99214 OFFICE O/P EST MOD 30 MIN: CPT | Performed by: NURSE PRACTITIONER

## 2019-08-19 RX ORDER — PAROXETINE 10 MG/1
10 TABLET, FILM COATED ORAL EVERY MORNING
Qty: 30 TABLET | Refills: 2 | Status: SHIPPED | OUTPATIENT
Start: 2019-08-19 | End: 2019-10-24 | Stop reason: SDUPTHER

## 2019-08-19 NOTE — PROGRESS NOTES
Subjective   Adina Thomas is a 39 y.o. female is here today for medication management follow-up.    Chief Complaint recheck on buprenorphine treatment  History of Present Illness: Presents to the [facility] for medication follow up.  She is considering trying to go back to work. She denies any depression or anxiety.  Denies any suicidal thoughts or any a/v hallucinations.  Body mass index is 28.48 kg/m². no appetite changes.  Continues to have some issues with family members but overall has good support.  No medical stressors.  No cravings.  Sleeping well at least 8 hours a night.  Mood is stable.  Ready to taper the buprenorphine dosage slightly.   .            The following portions of the patient's history were reviewed and updated as appropriate: allergies, current medications, past medical history, past social history, past surgical history and problem list.    Review of Systems   Constitutional: Negative for activity change, appetite change and fatigue.   HENT: Negative for ear pain.    Eyes: Negative for visual disturbance.   Respiratory: Negative.    Cardiovascular: Negative.    Gastrointestinal: Negative for nausea.   Endocrine: Negative.    Genitourinary: Negative.    Musculoskeletal: Negative for arthralgias.   Skin: Negative.    Allergic/Immunologic: Negative.    Neurological: Negative for dizziness, seizures and headaches.   Hematological: Negative.    Psychiatric/Behavioral: Negative for agitation, behavioral problems, confusion, decreased concentration, dysphoric mood, hallucinations, self-injury, sleep disturbance and suicidal ideas. The patient is nervous/anxious. The patient is not hyperactive.        Objective   Physical Exam   Constitutional: She is oriented to person, place, and time. She appears well-developed and well-nourished.   HENT:   Head: Normocephalic and atraumatic.   Neurological: She is alert and oriented to person, place, and time.   Psychiatric: She has a  "normal mood and affect. Her speech is normal and behavior is normal. Judgment and thought content normal. Cognition and memory are normal.   Vitals reviewed.    Blood pressure 126/78, pulse 82, height 162.6 cm (64.02\"), weight 75.3 kg (166 lb).    Medication List:   Current Outpatient Medications   Medication Sig Dispense Refill   • aspirin  MG tablet Take 325 mg by mouth Daily.  0   • atenolol (TENORMIN) 25 MG tablet Take 12.5 mg by mouth Daily.     • Buprenorphine HCl-Naloxone HCl 8.6-2.1 MG sublingual tablet Place 1 tablet under the tongue Daily. To be taken along with the 1.4mg for total of 10mg 28 tablet 0   • CALTRATE 600+D 600-800 MG-UNIT tablet      • hydrOXYzine (ATARAX) 10 MG tablet Take 1 tablet by mouth 3 (Three) Times a Day As Needed for Anxiety. 90 tablet 0   • ibuprofen (ADVIL,MOTRIN) 600 MG tablet Take 600 mg by mouth Every 6 (Six) Hours As Needed for mild pain (1-3).     • isosorbide mononitrate (IMDUR) 60 MG 24 hr tablet Take 60 mg by mouth Daily.  5   • levothyroxine (SYNTHROID, LEVOTHROID) 100 MCG tablet Take 100 mcg by mouth Daily.  2   • metoclopramide (REGLAN) 10 MG tablet   2   • omeprazole (PriLOSEC) 40 MG capsule Take 40 mg by mouth Daily.     • ondansetron (ZOFRAN) 8 MG tablet Take 8 mg by mouth Every 8 (Eight) Hours As Needed.     • PARoxetine (PAXIL) 10 MG tablet Take 1 tablet by mouth Every Morning. 30 tablet 2   • TRAVEL SICKNESS 25 MG chewable tablet chewable tablet As Needed.  0   • vitamin B-12 (CYANOCOBALAMIN) 1000 MCG tablet Take 1,000 mcg by mouth Daily.  2   • vitamin D (ERGOCALCIFEROL) 19498 units capsule capsule Take 50,000 Units by mouth Every 7 (Seven) Days.       No current facility-administered medications for this visit.        Mental Status Exam:   Hygiene:   good  Cooperation:  Cooperative  Eye Contact:  Good  Psychomotor Behavior:  Appropriate  Affect:  Full range  Hopelessness: Denies  Speech:  Normal  Thought Process:  Goal directed  Thought Content:  " Normal  Suicidal:  None  Homicidal:  None  Hallucinations:  None  Delusion:  None  Memory:  Intact  Orientation:  Person, Place, Time and Situation  Reliability:  good  Insight:  Good  Judgement:  Good  Impulse Control:  Fair  Physical/Medical Issues:  Yes hypothyroidism    Assessment/Plan   Problems Addressed this Visit     None      Visit Diagnoses     Opioid dependence on agonist therapy (CMS/HCC)    -  Primary    Relevant Medications    Buprenorphine HCl-Naloxone HCl 8.6-2.1 MG sublingual tablet    Moderate episode of recurrent major depressive disorder (CMS/HCC)        Relevant Medications    PARoxetine (PAXIL) 10 MG tablet    Panic disorder        Relevant Medications    PARoxetine (PAXIL) 10 MG tablet    Uncomplicated opioid dependence (CMS/HCC)        Relevant Medications    Buprenorphine HCl-Naloxone HCl 8.6-2.1 MG sublingual tablet        Functionality: pt having minimal impairment in important areas of daily functioning.  Prognosis:  Guarded dependent on medication/follow up and treatment plan compliance.  Alexx reviewed and appropriate.  UDS performed today and is pending.  Previous ones reviewed and appropriate.    She continues to be compliant with MAT treatment. Seen at [facility] for therapy appointment on 8/5/19.  Has upcoming appointment this week. Has attended NA meetings on the following dates: 7/24/19, 7/29, 8/1, 8/5, 8/9, 8/12, 8/17.  These have been scanned into epic.   She is tapering the buprenorphine down to 8.6mg today.  She is nervous but verbalizes readiness to do this.  Motivational interviewing was performed and pts strengths were discussed including her family support and her determination.           Continuing efforts to promote the therapeutic alliance, address the patient's issues, and strengthen self awareness, insights, and coping skills. s.   Patient is agreeable to call the [facility] any worsening of symptoms.  She continues to be compliant with buprenorphine and medication  assisted treatment.  Patient is aware to call 911 or go to the nearest ER should begin having SI/HI. RTC 4 weeks.

## 2019-08-21 ENCOUNTER — OFFICE VISIT (OUTPATIENT)
Dept: OBSTETRICS AND GYNECOLOGY | Facility: CLINIC | Age: 39
End: 2019-08-21

## 2019-08-21 VITALS
SYSTOLIC BLOOD PRESSURE: 110 MMHG | WEIGHT: 164.2 LBS | DIASTOLIC BLOOD PRESSURE: 74 MMHG | HEIGHT: 65 IN | BODY MASS INDEX: 27.36 KG/M2

## 2019-08-21 DIAGNOSIS — R61 NIGHT SWEATS: ICD-10-CM

## 2019-08-21 DIAGNOSIS — N91.2 AMENORRHEA: ICD-10-CM

## 2019-08-21 DIAGNOSIS — Z12.39 SCREENING FOR BREAST CANCER: ICD-10-CM

## 2019-08-21 DIAGNOSIS — R68.82 LOW LIBIDO: ICD-10-CM

## 2019-08-21 DIAGNOSIS — N89.8 VAGINAL DISCHARGE: Primary | ICD-10-CM

## 2019-08-21 PROCEDURE — 99213 OFFICE O/P EST LOW 20 MIN: CPT | Performed by: PHYSICIAN ASSISTANT

## 2019-08-21 NOTE — PROGRESS NOTES
Subjective   Chief Complaint   Patient presents with   • Vaginal Discharge     C/O chronic yeast infections, can't use Diflucan or over the counter medications due to drug interactions, C/O decreased libido   • Breast Problem     Pain and tenderness in breast       Adina Thomas is a 39 y.o. year old new patient  presenting to be seen for complaint of recurrent yeast infection symptoms.   Reports symptoms of vaginal discharge which is sometimes yellow and green, vaginal burning and itching and odor.  She reports symptoms come and go. She has been told she cannot use yeast medication Diflucan with Zebzolv medication that she takes due to potential medication interaction. .   She reports she has had issues with low libido for years. She denies any history of sexual abuse. She does not experience dyspareunia. She is able to have orgasm but just no desire to initiate sex. She feels her medications could be contributing to low libido.  She also reports having bilateral breast tenderness for close to a year. This is intermittant and sometimes goes away for a few weeks before returning. She has not felt any breast lumps or masses. No nipple discharge. Has no family history of breast cancer. She has never had a screening mammogram  She has Mirena IUD and no bleeding with IUD. She has night sweats often but no so much hot flashes during the day  Last pap was 1 to 1 1/2 years ago.          Past Medical History:   Diagnosis Date   • Abnormal ECG    • Abnormal Pap smear of cervix    • Anemia    • Anxiety    • Depression    • Hyperlipidemia    • Hypothyroidism    • Kidney stone    • Migraine    • Osteoarthritis    • PMS (premenstrual syndrome)    • Substance abuse (CMS/[facility])    • Urinary tract infection         Current Outpatient Medications:   •  atenolol (TENORMIN) 25 MG tablet, Take 12.5 mg by mouth Daily., Disp: , Rfl:   •  Buprenorphine HCl-Naloxone HCl 8.6-2.1 MG sublingual tablet, Place 1 tablet under the tongue  Daily. To be taken along with the 1.4mg for total of 10mg, Disp: 28 tablet, Rfl: 0  •  ibuprofen (ADVIL,MOTRIN) 600 MG tablet, Take 600 mg by mouth Every 6 (Six) Hours As Needed for mild pain (1-3)., Disp: , Rfl:   •  levothyroxine (SYNTHROID, LEVOTHROID) 100 MCG tablet, Take 100 mcg by mouth Daily., Disp: , Rfl: 2  •  metoclopramide (REGLAN) 10 MG tablet, , Disp: , Rfl: 2  •  omeprazole (PriLOSEC) 40 MG capsule, Take 40 mg by mouth Daily., Disp: , Rfl:   •  ondansetron (ZOFRAN) 8 MG tablet, Take 8 mg by mouth Every 8 (Eight) Hours As Needed., Disp: , Rfl:   •  PARoxetine (PAXIL) 10 MG tablet, Take 1 tablet by mouth Every Morning., Disp: 30 tablet, Rfl: 2  •  TRAVEL SICKNESS 25 MG chewable tablet chewable tablet, As Needed., Disp: , Rfl: 0  •  vitamin B-12 (CYANOCOBALAMIN) 1000 MCG tablet, Take 1,000 mcg by mouth Daily., Disp: , Rfl: 2  •  vitamin D (ERGOCALCIFEROL) 44576 units capsule capsule, Take 50,000 Units by mouth Every 7 (Seven) Days., Disp: , Rfl:    Allergies   Allergen Reactions   • Codeine GI Intolerance   • Demerol [Meperidine]      Not an allergic rxn, but caused hypotension at very small dose   • Prednisone Unknown (See Comments)     unknown   • Morphine And Related Rash   • Nuts Angioedema     Tongue swelling w/ pecan      Past Surgical History:   Procedure Laterality Date   • CHOLECYSTECTOMY     • DILATATION AND CURETTAGE     • LAPAROSCOPIC CHOLECYSTECTOMY     • THYROIDECTOMY        Social History     Socioeconomic History   • Marital status:      Spouse name: Not on file   • Number of children: Not on file   • Years of education: Not on file   • Highest education level: Not on file   Tobacco Use   • Smoking status: Current Every Day Smoker     Types: Electronic Cigarette   • Smokeless tobacco: Never Used   Substance and Sexual Activity   • Alcohol use: No   • Drug use: Yes   • Sexual activity: Yes     Partners: Male     Birth control/protection: IUD      Family History   Problem Relation Age  "of Onset   • Bipolar disorder Mother    • Depression Mother    • Bipolar disorder Father    • Coronary artery disease Maternal Grandmother    • Osteoporosis Maternal Grandmother    • Ovarian cancer Maternal Great-Grandmother        Review of Systems   Cardiovascular: Positive for palpitations.   Gastrointestinal: Positive for constipation and nausea.   Endocrine: Positive for cold intolerance and heat intolerance.   Genitourinary: Positive for pelvic pain and vaginal discharge.   Musculoskeletal: Positive for arthralgias.   Psychiatric/Behavioral: Positive for sleep disturbance.        Mood swings   All other systems reviewed and are negative.          Objective   /74   Ht 165.1 cm (65\")   Wt 74.5 kg (164 lb 3.2 oz)   Breastfeeding? No   BMI 27.32 kg/m²     Physical Exam   Constitutional: She appears well-developed and well-nourished. She is cooperative. No distress.   Eyes: Conjunctivae, EOM and lids are normal.   Cardiovascular: Normal rate, regular rhythm and normal heart sounds.   Pulmonary/Chest: Effort normal and breath sounds normal.   Abdominal: Soft. Normal appearance. There is no tenderness. There is no rigidity and no guarding.   Genitourinary: Uterus normal. There is no tenderness or lesion on the right labia. There is no tenderness or lesion on the left labia. Cervix exhibits no motion tenderness and no friability. Right adnexum displays no mass and no tenderness. Left adnexum displays no mass and no tenderness.   Genitourinary Comments: IUD strings seen  Small amount creamy yellow white discharge   Neurological: She is alert.   Skin: Skin is warm and dry.   Psychiatric: She has a normal mood and affect. Her behavior is normal.            Assessment and Plan  Adina was seen today for vaginal discharge and breast problem.    Diagnoses and all orders for this visit:    Vaginal discharge  -     NuSwab VG+ - Swab, Vagina    Amenorrhea  -     Follicle Stimulating Hormone  -     Luteinizing " Hormone  -     Estradiol    Night sweats  -     Follicle Stimulating Hormone  -     Luteinizing Hormone  -     Estradiol    Screening for breast cancer    Low libido      Patient Instructions   Will check hormone levels today and contact patient with results  VG plus swab done and will contact patient with results when available  Screening mammogram ordered  Encourage self breast exams monthly               This note was electronically signed.    Aspen Sanchez PA-C   August 21, 2019

## 2019-08-21 NOTE — PATIENT INSTRUCTIONS
Will check hormone levels today and contact patient with results  VG plus swab done and will contact patient with results when available  Screening mammogram ordered  Encourage self breast exams monthly

## 2019-08-22 LAB
ESTRADIOL SERPL-MCNC: 62.2 PG/ML
FSH SERPL-ACNC: 14.1 MIU/ML
LH SERPL-ACNC: 10.3 MIU/ML

## 2019-08-24 LAB
A VAGINAE DNA VAG QL NAA+PROBE: NORMAL SCORE
BVAB2 DNA VAG QL NAA+PROBE: NORMAL SCORE
C ALBICANS DNA VAG QL NAA+PROBE: NEGATIVE
C GLABRATA DNA VAG QL NAA+PROBE: NEGATIVE
C TRACH RRNA SPEC QL NAA+PROBE: NEGATIVE
MEGA1 DNA VAG QL NAA+PROBE: NORMAL SCORE
N GONORRHOEA RRNA SPEC QL NAA+PROBE: NEGATIVE
T VAGINALIS RRNA SPEC QL NAA+PROBE: NEGATIVE

## 2019-08-26 ENCOUNTER — TRANSCRIBE ORDERS (OUTPATIENT)
Dept: MAMMOGRAPHY | Facility: HOSPITAL | Age: 39
End: 2019-08-26

## 2019-08-26 DIAGNOSIS — Z12.39 BREAST CANCER SCREENING: Primary | ICD-10-CM

## 2019-09-16 ENCOUNTER — OFFICE VISIT (OUTPATIENT)
Dept: PSYCHIATRY | Facility: CLINIC | Age: 39
End: 2019-09-16

## 2019-09-16 VITALS
HEART RATE: 69 BPM | DIASTOLIC BLOOD PRESSURE: 74 MMHG | SYSTOLIC BLOOD PRESSURE: 116 MMHG | BODY MASS INDEX: 28.26 KG/M2 | WEIGHT: 169.6 LBS | HEIGHT: 65 IN

## 2019-09-16 DIAGNOSIS — F41.0 PANIC DISORDER: ICD-10-CM

## 2019-09-16 DIAGNOSIS — F11.20 UNCOMPLICATED OPIOID DEPENDENCE (HCC): ICD-10-CM

## 2019-09-16 DIAGNOSIS — F11.20 OPIOID DEPENDENCE ON AGONIST THERAPY (HCC): Primary | ICD-10-CM

## 2019-09-16 DIAGNOSIS — F33.1 MODERATE EPISODE OF RECURRENT MAJOR DEPRESSIVE DISORDER (HCC): ICD-10-CM

## 2019-09-16 PROCEDURE — 99214 OFFICE O/P EST MOD 30 MIN: CPT | Performed by: NURSE PRACTITIONER

## 2019-09-16 RX ORDER — CYANOCOBALAMIN 1000 UG/ML
INJECTION, SOLUTION INTRAMUSCULAR; SUBCUTANEOUS
Refills: 2 | COMMUNITY
Start: 2019-09-10

## 2019-09-16 NOTE — PROGRESS NOTES
Subjective   Adina Thomas is a 39 y.o. female is here today for medication management follow-up.    Chief Complaint recheck on buprenorphine treatment  History of Present Illness: Presents to the [facility] for medication follow up.  She states she has done well with the decreased dosage.  She states she has been a little more emotional but has not had any significant withdrawal.  Denies any depression or excessive anxiety.  Denies any panic attacks.  Denies any suicidal thoughts.  Body mass index is 28.22 kg/m². no appetite changes.  Sleeps well at least 8 hours a night.  No medical stressors.  No acute situational stressors.  No cravings.  Mood is stable.  Does have decreased libido but states she has always been this way. She has also began looking for a job.  Really wants to start working      .            The following portions of the patient's history were reviewed and updated as appropriate: allergies, current medications, past medical history, past social history, past surgical history and problem list.    Review of Systems   Constitutional: Negative for activity change, appetite change and fatigue.   HENT: Negative for ear pain.    Eyes: Negative for visual disturbance.   Respiratory: Negative.    Cardiovascular: Negative.    Gastrointestinal: Negative for nausea.   Endocrine: Negative.    Genitourinary: Negative.    Musculoskeletal: Negative for arthralgias.   Skin: Negative.    Allergic/Immunologic: Negative.    Neurological: Negative for dizziness, seizures and headaches.   Hematological: Negative.    Psychiatric/Behavioral: Negative for agitation, behavioral problems, confusion, decreased concentration, dysphoric mood, hallucinations, self-injury, sleep disturbance and suicidal ideas. The patient is not nervous/anxious and is not hyperactive.        Objective   Physical Exam   Constitutional: She is oriented to person, place, and time. She appears well-developed and  "well-nourished.   HENT:   Head: Normocephalic and atraumatic.   Neurological: She is alert and oriented to person, place, and time.   Psychiatric: She has a normal mood and affect. Her speech is normal and behavior is normal. Judgment and thought content normal. Cognition and memory are normal.   Pleasant and engaging   Vitals reviewed.    Blood pressure 116/74, pulse 69, height 165.1 cm (65\"), weight 76.9 kg (169 lb 9.6 oz), not currently breastfeeding.    Medication List:   Current Outpatient Medications   Medication Sig Dispense Refill   • atenolol (TENORMIN) 25 MG tablet Take 12.5 mg by mouth Daily.     • Buprenorphine HCl-Naloxone HCl 8.6-2.1 MG sublingual tablet Place 1 tablet under the tongue Daily. 46 tablet 0   • ibuprofen (ADVIL,MOTRIN) 600 MG tablet Take 600 mg by mouth Every 6 (Six) Hours As Needed for mild pain (1-3).     • levothyroxine (SYNTHROID, LEVOTHROID) 100 MCG tablet Take 100 mcg by mouth Daily.  2   • metoclopramide (REGLAN) 10 MG tablet   2   • omeprazole (PriLOSEC) 40 MG capsule Take 40 mg by mouth Daily.     • ondansetron (ZOFRAN) 8 MG tablet Take 8 mg by mouth Every 8 (Eight) Hours As Needed.     • PARoxetine (PAXIL) 10 MG tablet Take 1 tablet by mouth Every Morning. 30 tablet 2   • vitamin D (ERGOCALCIFEROL) 17006 units capsule capsule Take 50,000 Units by mouth Every 7 (Seven) Days.     • cyanocobalamin 1000 MCG/ML injection INJECT 1ML EVERY MONTH  2   • TRAVEL SICKNESS 25 MG chewable tablet chewable tablet As Needed.  0     No current facility-administered medications for this visit.        Mental Status Exam:   Hygiene:   good  Cooperation:  Cooperative  Eye Contact:  Good  Psychomotor Behavior:  Appropriate  Affect:  Full range  Hopelessness: Denies  Speech:  Normal  Thought Process:  Goal directed  Thought Content:  Normal  Suicidal:  None  Homicidal:  None  Hallucinations:  None  Delusion:  None  Memory:  Intact  Orientation:  Person, Place, Time and Situation  Reliability:  " good  Insight:  Good  Judgement:  Good  Impulse Control:  Fair  Physical/Medical Issues:  Yes hypothyroidism    Assessment/Plan   Problems Addressed this Visit     None      Visit Diagnoses     Opioid dependence on agonist therapy (CMS/HCC)    -  Primary    Relevant Medications    Buprenorphine HCl-Naloxone HCl 8.6-2.1 MG sublingual tablet    Moderate episode of recurrent major depressive disorder (CMS/HCC)        Panic disorder        Uncomplicated opioid dependence (CMS/HCC)        Relevant Medications    Buprenorphine HCl-Naloxone HCl 8.6-2.1 MG sublingual tablet        Functionality: pt having minimal impairment in important areas of daily functioning.  Prognosis:  Guarded dependent on medication/follow up and treatment plan compliance.  Alexx reviewed and appropriate.  UDS performed today and is pending.  Previous ones reviewed and appropriate.    She continues to be compliant with MAT treatment. Had to reschedule therapy appointment at [facility] to this Thursday.  Has attended several NA meetings and these have been scanned into epic.  Motivational interviewing performed and pt was praised for the decrease in the suboxone mg and her desire to get a job.  Going to continue the 8.6mg dosage for quite some time before decreasing again as pt has been on MAT for several years and decreasing to 8.6 was big step for her.             Continuing efforts to promote the therapeutic alliance, address the patient's issues, and strengthen self awareness, insights, and coping skills. s.   Patient is agreeable to call the [facility] any worsening of symptoms.  She continues to be compliant with buprenorphine and medication assisted treatment.  Patient is aware to call 911 or go to the nearest ER should begin having SI/HI. RTC aprox 6 weeks.

## 2019-10-23 ENCOUNTER — APPOINTMENT (OUTPATIENT)
Dept: MAMMOGRAPHY | Facility: HOSPITAL | Age: 39
End: 2019-10-23

## 2019-10-24 ENCOUNTER — OFFICE VISIT (OUTPATIENT)
Dept: PSYCHIATRY | Facility: CLINIC | Age: 39
End: 2019-10-24

## 2019-10-24 VITALS
WEIGHT: 164 LBS | SYSTOLIC BLOOD PRESSURE: 132 MMHG | DIASTOLIC BLOOD PRESSURE: 80 MMHG | HEIGHT: 65 IN | HEART RATE: 94 BPM | BODY MASS INDEX: 27.32 KG/M2

## 2019-10-24 DIAGNOSIS — F33.1 MODERATE EPISODE OF RECURRENT MAJOR DEPRESSIVE DISORDER (HCC): ICD-10-CM

## 2019-10-24 DIAGNOSIS — F41.0 PANIC DISORDER: ICD-10-CM

## 2019-10-24 DIAGNOSIS — F11.20 OPIOID DEPENDENCE ON AGONIST THERAPY (HCC): Primary | ICD-10-CM

## 2019-10-24 PROCEDURE — 99214 OFFICE O/P EST MOD 30 MIN: CPT | Performed by: NURSE PRACTITIONER

## 2019-10-24 RX ORDER — PAROXETINE 10 MG/1
10 TABLET, FILM COATED ORAL EVERY MORNING
Qty: 30 TABLET | Refills: 2 | Status: SHIPPED | OUTPATIENT
Start: 2019-10-24 | End: 2019-12-03 | Stop reason: SDUPTHER

## 2019-10-24 NOTE — TREATMENT PLAN
Multi-Disciplinary Problems (from Behavioral Health Treatment Plan)    Active Problems     Problem: Substance Abuse Issues  Start Date: 10/24/19    Problem Details:  The patient self-scales this problem as a 3 with 10 being the worst.       Goal Priority Start Date Expected End Date End Date    Patient will abstain from mood altering substances. -- 10/24/19 -- --    Goal Details:  Progress toward goal:  The patient self-scales their progress related to this goal as a 0/10       Goal Intervention Frequency Start Date End Date    Provide education to increase patients understanding of addiction and relapse processes. Weekly 10/24/19 --    Intervention Details:  Duration of treatment until until remission of symptoms.       Goal Priority Start Date Expected End Date End Date    Patient will develop insight into how to improve their relationships. -- 10/24/19 -- --    Goal Details:  Progress toward goal:  The patient self-scales their progress related to this goal as a 3 with 10 being the worst.       Goal Intervention Frequency Start Date End Date    Educate about 12 step recovery programs. Weekly 10/24/19 --    Intervention Details:  Duration of treatment until until remission of symptoms.       Goal Priority Start Date Expected End Date End Date    Patient will improve positive self regard. -- 10/24/19 -- --    Goal Details:  Progress toward goal:  The patient self-scales their progress related to this goal as a 3 with 10 being the worst.       Goal Intervention Frequency Start Date End Date    Educate patient about how substance use affects their relationships. Weekly 10/24/19 --    Intervention Details:  Duration of treatment until until remission of symptoms.                   Resolved Problems     Problem: Anxiety  Start Date: 10/24/19, Resolve Date: 10/24/19    Problem Details:  The patient self-scales this problem as a 3 with 10 being the worst.       Goal Priority Start Date Expected End Date End Date     Patient will develop and implement behavioral and cognitive strategies to reduce anxiety and irrational fears.  (RESOLVED) -- 10/24/19 -- 10/24/19    Goal Details:  Progress toward goal:  The patient self-scales their progress related to this goal as a 3 with 10 being the worst.        Goal Intervention Frequency Start Date End Date    Help patient explore past emotional issues in relation to present anxiety. Weekly 10/24/19 10/24/19    Intervention Details:  Duration of treatment until until remission of symptoms.       Goal Intervention Frequency Start Date End Date    Help patient develop an awareness of their cognitive and physical responses to anxiety. Weekly 10/24/19 10/24/19    Intervention Details:  Duration of treatment until until remission of symptoms.                          I have discussed and reviewed this treatment plan with the patient.

## 2019-10-24 NOTE — PROGRESS NOTES
Subjective   Adina Thoams is a 39 y.o. female is here today for medication management follow-up.    Chief Complaint recheck on buprenorphine treatment  History of Present Illness: Presents to the [facility] for medication follow up.  She has gotten a job and starts it Monday.  She is very excited for this.  She has not worked in over 6 years.  This is a really big step for her with her continual recovery.  Body mass index is 27.29 kg/m². weight loss 5 lbs since last visit.  She has been walking more.  She has also had oral surgery this week.  Has been on applesauce and clear liq for 3 days.  Denies any depression or anxiety.  Denies any cravings.  No suicidal thoughts.  No negative side effects to the meds. Sleeping well at least 8 hours a night.  No medical stressors other than recent oral surgery.  Did not get any pain medication for this. took ibuprofen.          The following portions of the patient's history were reviewed and updated as appropriate: allergies, current medications, past medical history, past social history, past surgical history and problem list.    Review of Systems   Constitutional: Negative for activity change, appetite change and fatigue.   HENT: Negative for ear pain.    Eyes: Negative for visual disturbance.   Respiratory: Negative.    Cardiovascular: Negative.    Gastrointestinal: Negative for nausea.   Endocrine: Negative.    Genitourinary: Negative.    Musculoskeletal: Negative for arthralgias.   Skin: Negative.    Allergic/Immunologic: Negative.    Neurological: Negative for dizziness, seizures and headaches.   Hematological: Negative.    Psychiatric/Behavioral: Negative for agitation, behavioral problems, confusion, decreased concentration, dysphoric mood, hallucinations, self-injury, sleep disturbance and suicidal ideas. The patient is not nervous/anxious and is not hyperactive.        Objective   Physical Exam   Constitutional: She is oriented to person,  "place, and time. She appears well-developed and well-nourished.   HENT:   Head: Normocephalic and atraumatic.   Neurological: She is alert and oriented to person, place, and time.   Psychiatric: She has a normal mood and affect. Her speech is normal and behavior is normal. Judgment and thought content normal. Cognition and memory are normal.   Pleasant and engaging   Vitals reviewed.    Blood pressure 132/80, pulse 94, height 165.1 cm (65\"), weight 74.4 kg (164 lb), not currently breastfeeding.    Medication List:   Current Outpatient Medications   Medication Sig Dispense Refill   • atenolol (TENORMIN) 25 MG tablet Take 12.5 mg by mouth Daily.     • Buprenorphine HCl-Naloxone HCl 8.6-2.1 MG sublingual tablet Place 1 tablet under the tongue Daily. 42 tablet 0   • cyanocobalamin 1000 MCG/ML injection INJECT 1ML EVERY MONTH  2   • ibuprofen (ADVIL,MOTRIN) 600 MG tablet Take 600 mg by mouth Every 6 (Six) Hours As Needed for mild pain (1-3).     • levothyroxine (SYNTHROID, LEVOTHROID) 100 MCG tablet Take 100 mcg by mouth Daily.  2   • metoclopramide (REGLAN) 10 MG tablet   2   • omeprazole (PriLOSEC) 40 MG capsule Take 40 mg by mouth Daily.     • ondansetron (ZOFRAN) 8 MG tablet Take 8 mg by mouth Every 8 (Eight) Hours As Needed.     • PARoxetine (PAXIL) 10 MG tablet Take 1 tablet by mouth Every Morning. 30 tablet 2   • vitamin D (ERGOCALCIFEROL) 97094 units capsule capsule Take 50,000 Units by mouth Every 7 (Seven) Days.       No current facility-administered medications for this visit.        Mental Status Exam:   Hygiene:   good  Cooperation:  Cooperative  Eye Contact:  Good  Psychomotor Behavior:  Appropriate  Affect:  Full range  Hopelessness: Denies  Speech:  Normal  Thought Process:  Goal directed  Thought Content:  Normal  Suicidal:  None  Homicidal:  None  Hallucinations:  None  Delusion:  None  Memory:  Intact  Orientation:  Person, Place, Time and Situation  Reliability:  good  Insight:  Good  Judgement:  " Good  Impulse Control:  Fair  Physical/Medical Issues:  Yes hypothyroidism    Assessment/Plan   Problems Addressed this Visit     None      Visit Diagnoses     Opioid dependence on agonist therapy (CMS/HCC)    -  Primary    Relevant Medications    Buprenorphine HCl-Naloxone HCl 8.6-2.1 MG sublingual tablet    Moderate episode of recurrent major depressive disorder (CMS/HCC)        Relevant Medications    PARoxetine (PAXIL) 10 MG tablet    Panic disorder        Relevant Medications    PARoxetine (PAXIL) 10 MG tablet        Functionality: pt having minimal impairment in important areas of daily functioning.  Prognosis:  Guarded dependent on medication/follow up and treatment plan compliance.  Alexx reviewed and appropriate.  UDS performed today and is pending.  Previous ones reviewed and appropriate.    She continues to be compliant with MAT treatment.she has brought in documentation of her therapy appointments and N/A meetings.  We discussed a slight taper of the buprenorphine at the next visit.  She continues to verbalize the importance of sobriety and demonstrates adequate coping skills.             Continuing efforts to promote the therapeutic alliance, address the patient's issues, and strengthen self awareness, insights, and coping skills. s.   Patient is agreeable to call the [facility] any worsening of symptoms.  She continues to be compliant with buprenorphine and medication assisted treatment.  Patient is aware to call 911 or go to the nearest ER should begin having SI/HI. RTC aprox 6 weeks.

## 2019-11-04 ENCOUNTER — TELEPHONE (OUTPATIENT)
Dept: PSYCHIATRY | Facility: CLINIC | Age: 39
End: 2019-11-04

## 2019-11-04 NOTE — TELEPHONE ENCOUNTER
Patient's pharmacy called stating that the Rx for Buprenorphine 8.6-2.1 mgsent on 10/24/19 was unable to be dispensed besides 10 tablets. He stated that they were unable to order the rest. He requested if they can fill 5.7 mg and be dispensed 1.5 tablets. He asked if you could call him concerning this his name is Noah at [facility].

## 2019-12-03 ENCOUNTER — TELEPHONE (OUTPATIENT)
Dept: PSYCHIATRY | Facility: CLINIC | Age: 39
End: 2019-12-03

## 2019-12-03 ENCOUNTER — OFFICE VISIT (OUTPATIENT)
Dept: PSYCHIATRY | Facility: CLINIC | Age: 39
End: 2019-12-03

## 2019-12-03 VITALS
SYSTOLIC BLOOD PRESSURE: 110 MMHG | BODY MASS INDEX: 27.29 KG/M2 | HEIGHT: 65 IN | WEIGHT: 163.8 LBS | DIASTOLIC BLOOD PRESSURE: 70 MMHG | HEART RATE: 70 BPM

## 2019-12-03 DIAGNOSIS — F11.20 OPIOID DEPENDENCE ON AGONIST THERAPY (HCC): Primary | ICD-10-CM

## 2019-12-03 DIAGNOSIS — F33.1 MODERATE EPISODE OF RECURRENT MAJOR DEPRESSIVE DISORDER (HCC): ICD-10-CM

## 2019-12-03 DIAGNOSIS — F41.0 PANIC DISORDER: ICD-10-CM

## 2019-12-03 PROCEDURE — 99213 OFFICE O/P EST LOW 20 MIN: CPT | Performed by: NURSE PRACTITIONER

## 2019-12-03 RX ORDER — PAROXETINE 10 MG/1
10 TABLET, FILM COATED ORAL EVERY MORNING
Qty: 30 TABLET | Refills: 2 | Status: SHIPPED | OUTPATIENT
Start: 2019-12-03 | End: 2020-03-04 | Stop reason: SDUPTHER

## 2019-12-03 NOTE — TELEPHONE ENCOUNTER
Pt called stating her Bupremorphine is needing a PA , Ron has been out of the office can someone cover

## 2019-12-03 NOTE — PROGRESS NOTES
Subjective   Adina Thomas is a 39 y.o. female is here today for medication management follow-up.    Chief Complaint recheck on buprenorphine treatment  History of Present Illness: Presents to the [facility] for medication follow up.  She continues to do well.  She is love working at her new job.  Denies any depression or panic attacks.  Minimal anxiety.  Sleeping well all night.  Denies any cravings.  Mood is stable.  Body mass index is 27.26 kg/m². no appetite changes.  No medical stressors. No negative side effects to the meds.    Looking forward to the upcoming holidays.             The following portions of the patient's history were reviewed and updated as appropriate: allergies, current medications, past medical history, past social history, past surgical history and problem list.    Review of Systems   Constitutional: Negative for activity change, appetite change and fatigue.   HENT: Negative for ear pain.    Eyes: Negative for visual disturbance.   Respiratory: Negative.    Cardiovascular: Negative.    Gastrointestinal: Negative for nausea.   Endocrine: Negative.    Genitourinary: Negative.    Musculoskeletal: Negative for arthralgias.   Skin: Negative.    Allergic/Immunologic: Negative.    Neurological: Negative for dizziness, seizures and headaches.   Hematological: Negative.    Psychiatric/Behavioral: Negative for agitation, behavioral problems, confusion, decreased concentration, dysphoric mood, hallucinations, self-injury, sleep disturbance and suicidal ideas. The patient is not nervous/anxious and is not hyperactive.        Objective   Physical Exam   Constitutional: She is oriented to person, place, and time. She appears well-developed and well-nourished.   HENT:   Head: Normocephalic and atraumatic.   Neurological: She is alert and oriented to person, place, and time.   Psychiatric: She has a normal mood and affect. Her speech is normal and behavior is normal. Judgment and  "thought content normal. Cognition and memory are normal.   Pleasant and engaging   Vitals reviewed.    Blood pressure 110/70, pulse 70, height 165.1 cm (65\"), weight 74.3 kg (163 lb 12.8 oz), not currently breastfeeding.    Medication List:   Current Outpatient Medications   Medication Sig Dispense Refill   • atenolol (TENORMIN) 25 MG tablet Take 12.5 mg by mouth Daily.     • Buprenorphine HCl-Naloxone HCl 8.6-2.1 MG sublingual tablet Place 1 tablet under the tongue Daily. 42 tablet 0   • cyanocobalamin 1000 MCG/ML injection INJECT 1ML EVERY MONTH  2   • ibuprofen (ADVIL,MOTRIN) 600 MG tablet Take 600 mg by mouth Every 6 (Six) Hours As Needed for mild pain (1-3).     • levothyroxine (SYNTHROID, LEVOTHROID) 100 MCG tablet Take 100 mcg by mouth Daily.  2   • metoclopramide (REGLAN) 10 MG tablet   2   • omeprazole (PriLOSEC) 40 MG capsule Take 40 mg by mouth Daily.     • ondansetron (ZOFRAN) 8 MG tablet Take 8 mg by mouth Every 8 (Eight) Hours As Needed.     • PARoxetine (PAXIL) 10 MG tablet Take 1 tablet by mouth Every Morning. 30 tablet 2   • vitamin D (ERGOCALCIFEROL) 10721 units capsule capsule Take 50,000 Units by mouth Every 7 (Seven) Days.       No current facility-administered medications for this visit.        Mental Status Exam:   Hygiene:   good  Cooperation:  Cooperative  Eye Contact:  Good  Psychomotor Behavior:  Appropriate  Affect:  Full range  Hopelessness: Denies  Speech:  Normal  Thought Process:  Goal directed  Thought Content:  Normal  Suicidal:  None  Homicidal:  None  Hallucinations:  None  Delusion:  None  Memory:  Intact  Orientation:  Person, Place, Time and Situation  Reliability:  good  Insight:  Good  Judgement:  Good  Impulse Control:  Fair  Physical/Medical Issues:  Yes hypothyroidism    Assessment/Plan   Problems Addressed this Visit     None      Visit Diagnoses     Opioid dependence on agonist therapy (CMS/[facility])    -  Primary    Relevant Medications    Buprenorphine HCl-Naloxone HCl " 8.6-2.1 MG sublingual tablet    Moderate episode of recurrent major depressive disorder (CMS/HCC)        Relevant Medications    PARoxetine (PAXIL) 10 MG tablet    Panic disorder        Relevant Medications    PARoxetine (PAXIL) 10 MG tablet        Functionality: pt having minimal impairment in important areas of daily functioning.  Prognosis:  Guarded dependent on medication/follow up and treatment plan compliance.  Alexx reviewed and appropriate.  UDS performed today and is pending.  Previous ones reviewed and appropriate.    She continues to be compliant with MAT treatment. she has brought in documentation of her therapy appointments and N/A meetings. These have been reviewed.   These have been scanned into Epic.    She continues to verbalize the importance of sobriety and demonstrates adequate coping skills. I am going on medical leave for 4 weeks.  She will do a slight taper on her next visit upon return.  She did not want to taper today and not be able to get a hold of me.         Continuing efforts to promote the therapeutic alliance, address the patient's issues, and strengthen self awareness, insights, and coping skills..   Patient is agreeable to call the [facility] any worsening of symptoms.  She continues to be compliant with buprenorphine and medication assisted treatment.  Patient is aware to call 911 or go to the nearest ER should begin having SI/HI. RTC aprox 6 weeks.

## 2019-12-05 ENCOUNTER — TELEPHONE (OUTPATIENT)
Dept: PSYCHIATRY | Facility: CLINIC | Age: 39
End: 2019-12-05

## 2019-12-30 ENCOUNTER — TELEPHONE (OUTPATIENT)
Dept: PSYCHIATRY | Facility: CLINIC | Age: 39
End: 2019-12-30

## 2019-12-30 NOTE — TELEPHONE ENCOUNTER
Spoke with patient on 12/26/19 and she states pharmacy is still having trouble filling her RX. Spoke with pharmacy and made them aware we had PA approval for buprenorphine that was done while I was out of the office,they state patients RX is for Zubsolv. I submitted PA for Zubsolv on 12/26/2019.   Spoke with [facility] this morning and faxed supporting documentation and  expedited request for determination this morning (12/30/2019). Made Adina aware.

## 2020-01-02 ENCOUNTER — TELEPHONE (OUTPATIENT)
Dept: PSYCHIATRY | Facility: CLINIC | Age: 40
End: 2020-01-02

## 2020-01-02 NOTE — TELEPHONE ENCOUNTER
Called to make patient aware appeal for zubsolv was approved and has an effective date of 11/20/2019 and will be good through 12/30/2020. auth number : [id number]  No answer, left message.

## 2020-01-13 ENCOUNTER — OFFICE VISIT (OUTPATIENT)
Dept: PSYCHIATRY | Facility: CLINIC | Age: 40
End: 2020-01-13

## 2020-01-13 VITALS
DIASTOLIC BLOOD PRESSURE: 74 MMHG | WEIGHT: 163.2 LBS | HEIGHT: 65 IN | HEART RATE: 90 BPM | BODY MASS INDEX: 27.19 KG/M2 | SYSTOLIC BLOOD PRESSURE: 122 MMHG

## 2020-01-13 DIAGNOSIS — F33.1 MODERATE EPISODE OF RECURRENT MAJOR DEPRESSIVE DISORDER (HCC): ICD-10-CM

## 2020-01-13 DIAGNOSIS — F41.0 PANIC DISORDER: ICD-10-CM

## 2020-01-13 DIAGNOSIS — F11.20 OPIOID DEPENDENCE ON AGONIST THERAPY (HCC): Primary | ICD-10-CM

## 2020-01-13 PROCEDURE — 99214 OFFICE O/P EST MOD 30 MIN: CPT | Performed by: NURSE PRACTITIONER

## 2020-01-13 RX ORDER — BUPRENORPHINE HYDROCHLORIDE AND NALOXONE HYDROCHLORIDE DIHYDRATE 8; 2 MG/1; MG/1
1 TABLET SUBLINGUAL DAILY
Qty: 50 TABLET | Refills: 0 | Status: SHIPPED | OUTPATIENT
Start: 2020-01-13 | End: 2020-03-04 | Stop reason: SDUPTHER

## 2020-01-13 NOTE — PROGRESS NOTES
Subjective   Adina Thomas is a 39 y.o. female is here today for medication management follow-up.    Chief Complaint recheck on buprenorphine treatment  History of Present Illness: Presents to the [facility] for medication follow up.  She continues to do well.  She is love working at her new job.  Denies any depression or panic attacks.  Minimal anxiety.  Sleeping well all night.  Denies any cravings.  Mood is stable.  Body mass index is 27.16 kg/m². no appetite changes.  No medical stressors. No negative side effects to the meds.   Mood is stable.  No medical stressors        The following portions of the patient's history were reviewed and updated as appropriate: allergies, current medications, past medical history, past social history, past surgical history and problem list.    Review of Systems   Constitutional: Negative for activity change, appetite change and fatigue.   HENT: Negative for ear pain.    Eyes: Negative for visual disturbance.   Respiratory: Negative.    Cardiovascular: Negative.    Gastrointestinal: Negative for nausea.   Endocrine: Negative.    Genitourinary: Negative.    Musculoskeletal: Negative for arthralgias.   Skin: Negative.    Allergic/Immunologic: Negative.    Neurological: Negative for dizziness, seizures and headaches.   Hematological: Negative.    Psychiatric/Behavioral: Negative for agitation, behavioral problems, confusion, decreased concentration, dysphoric mood, hallucinations, self-injury, sleep disturbance and suicidal ideas. The patient is not nervous/anxious and is not hyperactive.        Objective   Physical Exam   Constitutional: She is oriented to person, place, and time. She appears well-developed and well-nourished.   HENT:   Head: Normocephalic and atraumatic.   Neurological: She is alert and oriented to person, place, and time.   Psychiatric: She has a normal mood and affect. Her speech is normal and behavior is normal. Judgment and thought  "content normal. Cognition and memory are normal.   Pleasant and engaging   Vitals reviewed.    Blood pressure 122/74, pulse 90, height 165.1 cm (65\"), weight 74 kg (163 lb 3.2 oz), not currently breastfeeding.    Medication List:   Current Outpatient Medications   Medication Sig Dispense Refill   • atenolol (TENORMIN) 25 MG tablet Take 12.5 mg by mouth Daily.     • cyanocobalamin 1000 MCG/ML injection INJECT 1ML EVERY MONTH  2   • ibuprofen (ADVIL,MOTRIN) 600 MG tablet Take 600 mg by mouth Every 6 (Six) Hours As Needed for mild pain (1-3).     • levothyroxine (SYNTHROID, LEVOTHROID) 100 MCG tablet Take 100 mcg by mouth Daily.  2   • metoclopramide (REGLAN) 10 MG tablet   2   • omeprazole (PriLOSEC) 40 MG capsule Take 40 mg by mouth Daily.     • ondansetron (ZOFRAN) 8 MG tablet Take 8 mg by mouth Every 8 (Eight) Hours As Needed.     • PARoxetine (PAXIL) 10 MG tablet Take 1 tablet by mouth Every Morning. 30 tablet 2   • vitamin D (ERGOCALCIFEROL) 97778 units capsule capsule Take 50,000 Units by mouth Every 7 (Seven) Days.     • buprenorphine-naloxone (SUBOXONE) 8-2 MG per SL tablet Place 1 tablet under the tongue Daily. 50 tablet 0     No current facility-administered medications for this visit.        Mental Status Exam:   Hygiene:   good  Cooperation:  Cooperative  Eye Contact:  Good  Psychomotor Behavior:  Appropriate  Affect:  Full range  Hopelessness: Denies  Speech:  Normal  Thought Process:  Goal directed  Thought Content:  Normal  Suicidal:  None  Homicidal:  None  Hallucinations:  None  Delusion:  None  Memory:  Intact  Orientation:  Person, Place, Time and Situation  Reliability:  good  Insight:  Good  Judgement:  Good  Impulse Control:  Fair  Physical/Medical Issues:  Yes hypothyroidism    Assessment/Plan   Problems Addressed this Visit     None      Visit Diagnoses     Opioid dependence on agonist therapy (CMS/[facility])    -  Primary    Relevant Medications    buprenorphine-naloxone (SUBOXONE) 8-2 MG per SL " tablet    Moderate episode of recurrent major depressive disorder (CMS/HCC)        Panic disorder            Functionality: pt having minimal impairment in important areas of daily functioning.  Prognosis:  Guarded dependent on medication/follow up and treatment plan compliance.  Alexx reviewed and appropriate.  UDS performed today and is pending.  Previous ones reviewed and appropriate.  Documentation received of pts therapy appointment on 12/26/19 and several NA appointments scanned into epic.   Today pt is ready to taper the buprenorphine slightly.  I am decreasing the dose to 8/2mg dosing.  She continues to exhibit positive coping skills and current situation is stable.  She verbalizes readiness.   Motivational interviewing performed and pt was praised for her continued sobriety and continued desire to taper medication        Continuing efforts to promote the therapeutic alliance, address the patient's issues, and strengthen self awareness, insights, and coping skills..   Patient is agreeable to call the [facility] any worsening of symptoms.  She continues to be compliant with buprenorphine and medication assisted treatment.  Patient is aware to call 911 or go to the nearest ER should begin having SI/HI. RTC aprox 6 weeks.

## 2020-01-14 ENCOUNTER — TELEPHONE (OUTPATIENT)
Dept: PSYCHIATRY | Facility: CLINIC | Age: 40
End: 2020-01-14

## 2020-01-14 DIAGNOSIS — F11.20 OPIOID DEPENDENCE ON AGONIST THERAPY (HCC): Primary | ICD-10-CM

## 2020-01-14 RX ORDER — BUPRENORPHINE HYDROCHLORIDE AND NALOXONE HYDROCHLORIDE DIHYDRATE 2; .5 MG/1; MG/1
1.5 TABLET SUBLINGUAL DAILY
Qty: 50 TABLET | Refills: 0 | Status: SHIPPED | OUTPATIENT
Start: 2020-01-14 | End: 2020-02-03 | Stop reason: SDUPTHER

## 2020-01-14 NOTE — TELEPHONE ENCOUNTER
I advised patient. She states that she was told that 5.7mg zubsolv was equivalent to 8mg suboxone and she is concerned for withdraw.

## 2020-01-14 NOTE — TELEPHONE ENCOUNTER
Patient called in concerned about her medicine. She states when she picked it up they gave her a RX for regular suboxone and she has been taking zubsolv. She knew you were decreasing it from 8.6 to 8 but she assumed it would still be zubsolv as this is what she has been taking. I checked it with her [facility] and it shows zubsolv as well. She is is concerned as she was told it would not metabolize the same and though they are both buprenorphine they are not equivalent doses?

## 2020-01-14 NOTE — TELEPHONE ENCOUNTER
Tell her she is correct.  I have added her to take a 1 1/2 of a suboxone 3 mg to take along with the other.  Tell her this will put her where we discussed.  Hopefully at her next visit we can simplify it down.

## 2020-01-14 NOTE — TELEPHONE ENCOUNTER
It is just a different company.  Should work the same.  zubsolv does not come in an 8mg that is why it was no problem to switch on insurance

## 2020-02-03 ENCOUNTER — TELEPHONE (OUTPATIENT)
Dept: PSYCHIATRY | Facility: CLINIC | Age: 40
End: 2020-02-03

## 2020-02-03 DIAGNOSIS — F11.20 OPIOID DEPENDENCE ON AGONIST THERAPY (HCC): ICD-10-CM

## 2020-02-03 RX ORDER — BUPRENORPHINE HYDROCHLORIDE AND NALOXONE HYDROCHLORIDE DIHYDRATE 2; .5 MG/1; MG/1
1.5 TABLET SUBLINGUAL DAILY
Qty: 25 TABLET | Refills: 0 | Status: SHIPPED | OUTPATIENT
Start: 2020-02-03 | End: 2020-03-04 | Stop reason: SDUPTHER

## 2020-02-03 NOTE — TELEPHONE ENCOUNTER
Patient called in and states we sent in  2mg suboxone to take along with her other dose,and only sent a qty of 50 and she is supposed to take 1.5 tabs daily. She doesn't need them just yet but says the pharmacy told her she would need 25 more 2mg tablets to have enough to take with her 8mg daily.

## 2020-03-04 ENCOUNTER — OFFICE VISIT (OUTPATIENT)
Dept: PSYCHIATRY | Facility: CLINIC | Age: 40
End: 2020-03-04

## 2020-03-04 VITALS
BODY MASS INDEX: 28.16 KG/M2 | DIASTOLIC BLOOD PRESSURE: 78 MMHG | SYSTOLIC BLOOD PRESSURE: 120 MMHG | WEIGHT: 169 LBS | HEIGHT: 65 IN | HEART RATE: 96 BPM

## 2020-03-04 DIAGNOSIS — F41.0 PANIC DISORDER: ICD-10-CM

## 2020-03-04 DIAGNOSIS — F11.20 OPIOID DEPENDENCE ON AGONIST THERAPY (HCC): Primary | ICD-10-CM

## 2020-03-04 DIAGNOSIS — F33.1 MODERATE EPISODE OF RECURRENT MAJOR DEPRESSIVE DISORDER (HCC): ICD-10-CM

## 2020-03-04 PROCEDURE — 99214 OFFICE O/P EST MOD 30 MIN: CPT | Performed by: NURSE PRACTITIONER

## 2020-03-04 RX ORDER — PAROXETINE 10 MG/1
10 TABLET, FILM COATED ORAL EVERY MORNING
Qty: 30 TABLET | Refills: 2 | Status: SHIPPED | OUTPATIENT
Start: 2020-03-04 | End: 2020-04-14 | Stop reason: SDUPTHER

## 2020-03-04 RX ORDER — BUPRENORPHINE HYDROCHLORIDE AND NALOXONE HYDROCHLORIDE DIHYDRATE 2; .5 MG/1; MG/1
1 TABLET SUBLINGUAL DAILY
Qty: 42 TABLET | Refills: 0 | Status: SHIPPED | OUTPATIENT
Start: 2020-03-04 | End: 2020-04-14 | Stop reason: SDUPTHER

## 2020-03-04 RX ORDER — BUPRENORPHINE HYDROCHLORIDE AND NALOXONE HYDROCHLORIDE DIHYDRATE 8; 2 MG/1; MG/1
1 TABLET SUBLINGUAL DAILY
Qty: 42 TABLET | Refills: 0 | Status: SHIPPED | OUTPATIENT
Start: 2020-03-04 | End: 2020-04-14 | Stop reason: SDUPTHER

## 2020-03-04 RX ORDER — PAROXETINE 10 MG/1
10 TABLET, FILM COATED ORAL EVERY MORNING
Qty: 30 TABLET | Refills: 2 | Status: SHIPPED | OUTPATIENT
Start: 2020-03-04 | End: 2020-03-04 | Stop reason: SDUPTHER

## 2020-03-04 NOTE — PROGRESS NOTES
Subjective   Adina Thomas is a 39 y.o. female is here today for medication management follow-up.    Chief Complaint recheck on buprenorphine treatment  History of Present Illness: Presents to the [facility] for medication follow up.   Continues to do well.  She denies depression or anxiety.  Denies any cravings.  She is continuing working at her job which she enjoys.  She is sleeping well at night.  Mood is stable.  No appetite changes or medical stressors.  Switching from the zubsolv to the Suboxone did not cause any issues.Body mass index is 28.12 kg/m².  Weight gain of 6 pounds since last office visit.  She states she is eating a lot of fast food with her job at lunch and states that she will start watching this.  She is very pleased with her progress and her medication at the present time.  She does feel that she is able to come down slightly more with the Suboxone today.       The following portions of the patient's history were reviewed and updated as appropriate: allergies, current medications, past medical history, past social history, past surgical history and problem list.    Review of Systems   Constitutional: Negative for activity change, appetite change and fatigue.   HENT: Negative for ear pain.    Eyes: Negative for visual disturbance.   Respiratory: Negative.    Cardiovascular: Negative.    Gastrointestinal: Negative for nausea.   Endocrine: Negative.    Genitourinary: Negative.    Musculoskeletal: Negative for arthralgias.   Skin: Negative.    Allergic/Immunologic: Negative.    Neurological: Negative for dizziness, seizures and headaches.   Hematological: Negative.    Psychiatric/Behavioral: Negative for agitation, behavioral problems, confusion, decreased concentration, dysphoric mood, hallucinations, self-injury, sleep disturbance and suicidal ideas. The patient is not nervous/anxious and is not hyperactive.        Objective   Physical Exam   Constitutional: She is oriented  "to person, place, and time. She appears well-developed and well-nourished.   HENT:   Head: Normocephalic and atraumatic.   Neurological: She is alert and oriented to person, place, and time.   Psychiatric: She has a normal mood and affect. Her speech is normal and behavior is normal. Judgment and thought content normal. Cognition and memory are normal.   Pleasant and engaging   Vitals reviewed.    Blood pressure 120/78, pulse 96, height 165.1 cm (65\"), weight 76.7 kg (169 lb), not currently breastfeeding.    Medication List:   Current Outpatient Medications   Medication Sig Dispense Refill   • atenolol (TENORMIN) 25 MG tablet Take 12.5 mg by mouth Daily.     • buprenorphine-naloxone (SUBOXONE) 2-0.5 MG per SL tablet Place 1 tablet under the tongue Daily. To be taken along with suboxone 8 mg for total of 10 mg 42 tablet 0   • buprenorphine-naloxone (SUBOXONE) 8-2 MG per SL tablet Place 1 tablet under the tongue Daily. 42 tablet 0   • cyanocobalamin 1000 MCG/ML injection INJECT 1ML EVERY MONTH  2   • ibuprofen (ADVIL,MOTRIN) 600 MG tablet Take 600 mg by mouth Every 6 (Six) Hours As Needed for mild pain (1-3).     • levothyroxine (SYNTHROID, LEVOTHROID) 100 MCG tablet Take 100 mcg by mouth Daily.  2   • metoclopramide (REGLAN) 10 MG tablet   2   • omeprazole (PriLOSEC) 40 MG capsule Take 40 mg by mouth Daily.     • ondansetron (ZOFRAN) 8 MG tablet Take 8 mg by mouth Every 8 (Eight) Hours As Needed.     • PARoxetine (PAXIL) 10 MG tablet Take 1 tablet by mouth Every Morning. 30 tablet 2   • vitamin D (ERGOCALCIFEROL) 05764 units capsule capsule Take 50,000 Units by mouth Every 7 (Seven) Days.       No current facility-administered medications for this visit.        Mental Status Exam:   Hygiene:   good  Cooperation:  Cooperative  Eye Contact:  Good  Psychomotor Behavior:  Appropriate  Affect:  Full range  Hopelessness: Denies  Speech:  Normal  Thought Process:  Goal directed  Thought Content:  Normal  Suicidal:  " None  Homicidal:  None  Hallucinations:  None  Delusion:  None  Memory:  Intact  Orientation:  Person, Place, Time and Situation  Reliability:  good  Insight:  Good  Judgement:  Good  Impulse Control:  Fair  Physical/Medical Issues:  Yes hypothyroidism    Assessment/Plan   Problems Addressed this Visit     None      Visit Diagnoses     Opioid dependence on agonist therapy (CMS/HCC)    -  Primary    Relevant Medications    buprenorphine-naloxone (SUBOXONE) 8-2 MG per SL tablet    buprenorphine-naloxone (SUBOXONE) 2-0.5 MG per SL tablet    Moderate episode of recurrent major depressive disorder (CMS/HCC)        Relevant Medications    PARoxetine (PAXIL) 10 MG tablet    Panic disorder        Relevant Medications    PARoxetine (PAXIL) 10 MG tablet        Functionality: pt having minimal impairment in important areas of daily functioning.  Prognosis:  Guarded dependent on medication/follow up and treatment plan compliance.  Alexx reviewed and appropriate.  UDS performed today and is pending.  Previous ones reviewed and appropriate.  Decreasing the suboxone to 10 mg total.  Pt continues to progress in the MAT program.  Continues to verbalize the importance of maintaining sobriety.  She continues to be productive at her job.  She continues to have a positive outlook.  Patient was praised for her success.  I will continue her on the 10 mg for now.  She is to continue        Continuing efforts to promote the therapeutic alliance, address the patient's issues, and strengthen self awareness, insights, and coping skills..   Patient is agreeable to call the [facility] any worsening of symptoms.  She continues to be compliant with buprenorphine and medication assisted treatment.  Patient is aware to call 911 or go to the nearest ER should begin having SI/HI. RTC aprox 6 weeks.

## 2020-04-14 ENCOUNTER — TELEMEDICINE (OUTPATIENT)
Dept: PSYCHIATRY | Facility: CLINIC | Age: 40
End: 2020-04-14

## 2020-04-14 DIAGNOSIS — F33.1 MODERATE EPISODE OF RECURRENT MAJOR DEPRESSIVE DISORDER (HCC): ICD-10-CM

## 2020-04-14 DIAGNOSIS — F11.20 OPIOID DEPENDENCE ON AGONIST THERAPY (HCC): ICD-10-CM

## 2020-04-14 DIAGNOSIS — F41.0 PANIC DISORDER: ICD-10-CM

## 2020-04-14 PROCEDURE — 99213 OFFICE O/P EST LOW 20 MIN: CPT | Performed by: NURSE PRACTITIONER

## 2020-04-14 RX ORDER — PAROXETINE 10 MG/1
10 TABLET, FILM COATED ORAL EVERY MORNING
Qty: 30 TABLET | Refills: 2 | Status: SHIPPED | OUTPATIENT
Start: 2020-04-14 | End: 2020-05-26 | Stop reason: SDUPTHER

## 2020-04-14 RX ORDER — BUPRENORPHINE HYDROCHLORIDE AND NALOXONE HYDROCHLORIDE DIHYDRATE 2; .5 MG/1; MG/1
1 TABLET SUBLINGUAL DAILY
Qty: 42 TABLET | Refills: 0 | Status: SHIPPED | OUTPATIENT
Start: 2020-04-14 | End: 2020-05-26 | Stop reason: SDUPTHER

## 2020-04-14 RX ORDER — BUPRENORPHINE HYDROCHLORIDE AND NALOXONE HYDROCHLORIDE DIHYDRATE 8; 2 MG/1; MG/1
1 TABLET SUBLINGUAL DAILY
Qty: 42 TABLET | Refills: 0 | Status: SHIPPED | OUTPATIENT
Start: 2020-04-14 | End: 2020-05-26 | Stop reason: SDUPTHER

## 2020-04-14 NOTE — PROGRESS NOTES
Subjective   Patient ID: Adina Thomas is a 39 y.o. female patient is being seen today via telemedicine visit through my chart.  This is due to the COVID 19 pandemic.  Provider is present in the office.  Patient gives permission for the video visit to occur.     {History of Present Illness   Patient states that she is doing good.  She continues to work at her job during the pandemic.  She says that her  has been laid off and is currently home and everyone being together so much does cause some increased stress and anxiety and irritability in general.  Overall she feels her depression is doing well.  She denies any suicidal thoughts.  She denies any cravings.  She is tolerating her medications without any difficulty.  Continuing to be able to work full-time.  She continues to be very pleased with herself.  Mood is stable.  She is sleeping well at night without difficulty.  No negative side effects to the medication.  Dates that her current increase in anxiety is due to the pandemic and not being able to get out and go anywhere.    The following portions of the patient's history were reviewed and updated as appropriate: allergies, current medications, past family history, past medical history, past social history, past surgical history and problem list.    Review of Systems   Constitutional: Negative for activity change, appetite change and fatigue.   HENT: Negative.    Eyes: Negative for visual disturbance.   Respiratory: Negative.    Cardiovascular: Negative.    Gastrointestinal: Negative for nausea.   Endocrine: Negative.    Genitourinary: Negative.    Musculoskeletal: Negative for arthralgias.   Skin: Negative.    Allergic/Immunologic: Negative.    Neurological: Negative for dizziness, seizures and headaches.   Hematological: Negative.    Psychiatric/Behavioral: Negative for agitation, behavioral problems, confusion, decreased concentration, dysphoric mood, hallucinations, self-injury, sleep disturbance  and suicidal ideas. The patient is nervous/anxious. The patient is not hyperactive.        Objective   Mental Status Exam  Appearance:  clean and casually dressed, appropriate  Attitude toward clinician:  cooperative and agreeable   Speech:    Rate:  regular rate and rhythm   Volume:  normal  Motor:  no abnormal movements present  Mood:  appropriate  Affect:  euthymic  Thought Processes:  linear, logical, and goal directed  Thought Content:  normal  Suicidal Thoughts:  absent  Homicidal Thoughts:  absent  Perceptual Disturbance: no perceptual disturbance  Attention and Concentration:  good  Insight and Judgement:  fair  Memory:  memory appears to be intact  Physical Exam   Constitutional: She is oriented to person, place, and time. She appears well-developed.   Eyes: Pupils are equal, round, and reactive to light. EOM are normal.   Neck: Normal range of motion.   Neurological: She is alert and oriented to person, place, and time.   Psychiatric: She has a normal mood and affect. Her speech is normal and behavior is normal. Judgment and thought content normal. Cognition and memory are normal.   Pleasant and cooperative   Vitals reviewed.    Lab Review:   not applicable  Assessment/Plan   Diagnoses and all orders for this visit:    Opioid dependence on agonist therapy (CMS/HCC)  -     buprenorphine-naloxone (SUBOXONE) 2-0.5 MG per SL tablet; Place 1 tablet under the tongue Daily. To be taken along with suboxone 8 mg for total of 10 mg  -     buprenorphine-naloxone (SUBOXONE) 8-2 MG per SL tablet; Place 1 tablet under the tongue Daily.    Moderate episode of recurrent major depressive disorder (CMS/HCC)  -     PARoxetine (PAXIL) 10 MG tablet; Take 1 tablet by mouth Every Morning.    Panic disorder  -     PARoxetine (PAXIL) 10 MG tablet; Take 1 tablet by mouth Every Morning.    Patient and I had a lengthy discussion regarding her treatment plan.  With the current increased stress and nervousness related to the pandemic I  am not titrating her Suboxone dosing.  At the next visit our plan is to decrease the total milligrams to 9 mg by breaking the 2 mg in half.  She is to continue follow-up with phone visits with her therapist that [facility].  As the face-to-face visits are not permitted.  Continues to verbalize the importance of sobriety.  I will have her return to her regularly scheduled visit in 6 weeks.  If the pandemic continues to be going on we will switch this to a video visit.  No follow-ups on file.

## 2020-05-26 ENCOUNTER — OFFICE VISIT (OUTPATIENT)
Dept: PSYCHIATRY | Facility: CLINIC | Age: 40
End: 2020-05-26

## 2020-05-26 VITALS
BODY MASS INDEX: 28.26 KG/M2 | SYSTOLIC BLOOD PRESSURE: 143 MMHG | WEIGHT: 169.6 LBS | HEIGHT: 65 IN | HEART RATE: 109 BPM | DIASTOLIC BLOOD PRESSURE: 81 MMHG

## 2020-05-26 DIAGNOSIS — F33.1 MODERATE EPISODE OF RECURRENT MAJOR DEPRESSIVE DISORDER (HCC): ICD-10-CM

## 2020-05-26 DIAGNOSIS — F41.0 PANIC DISORDER: ICD-10-CM

## 2020-05-26 DIAGNOSIS — F11.20 OPIOID DEPENDENCE ON AGONIST THERAPY (HCC): Primary | ICD-10-CM

## 2020-05-26 PROCEDURE — 99214 OFFICE O/P EST MOD 30 MIN: CPT | Performed by: NURSE PRACTITIONER

## 2020-05-26 RX ORDER — PAROXETINE 10 MG/1
10 TABLET, FILM COATED ORAL EVERY MORNING
Qty: 30 TABLET | Refills: 2 | Status: SHIPPED | OUTPATIENT
Start: 2020-05-26 | End: 2020-08-19 | Stop reason: SDUPTHER

## 2020-05-26 RX ORDER — BUPRENORPHINE HYDROCHLORIDE AND NALOXONE HYDROCHLORIDE DIHYDRATE 2; .5 MG/1; MG/1
0.5 TABLET SUBLINGUAL DAILY
Qty: 21 TABLET | Refills: 0 | Status: SHIPPED | OUTPATIENT
Start: 2020-05-26 | End: 2020-07-08

## 2020-05-26 RX ORDER — BUPRENORPHINE HYDROCHLORIDE AND NALOXONE HYDROCHLORIDE DIHYDRATE 8; 2 MG/1; MG/1
1 TABLET SUBLINGUAL DAILY
Qty: 42 TABLET | Refills: 0 | Status: SHIPPED | OUTPATIENT
Start: 2020-05-26 | End: 2020-07-08 | Stop reason: SDUPTHER

## 2020-05-26 NOTE — PROGRESS NOTES
Subjective   Adina Thomas is a 39 y.o. female is here today for medication management follow-up.    Chief Complaint recheck on buprenorphine treatment  History of Present Illness: Presents to the [facility] for medication follow up.   Continues to do well.  She denies depression or anxiety.  Denies any cravings.  She is continuing working at her job which she enjoys.  She is sleeping well at night.  Mood is stable.  No appetite changes or medical stressors. Body mass index is 28.22 kg/m². no appetite changes.  No medical stressors.  No negative side effects to the meds.  She feels ready to continue the slow taper of the suboxone.                The following portions of the patient's history were reviewed and updated as appropriate: allergies, current medications, past medical history, past social history, past surgical history and problem list.    Review of Systems   Constitutional: Negative for activity change, appetite change and fatigue.   HENT: Negative for ear pain.    Eyes: Negative for visual disturbance.   Respiratory: Negative.    Cardiovascular: Negative.    Gastrointestinal: Negative for nausea.   Endocrine: Negative.    Genitourinary: Negative.    Musculoskeletal: Negative for arthralgias.   Skin: Negative.    Allergic/Immunologic: Negative.    Neurological: Negative for dizziness, seizures and headaches.   Hematological: Negative.    Psychiatric/Behavioral: Negative for agitation, behavioral problems, confusion, decreased concentration, dysphoric mood, hallucinations, self-injury, sleep disturbance and suicidal ideas. The patient is not nervous/anxious and is not hyperactive.        Objective   Physical Exam   Constitutional: She is oriented to person, place, and time. She appears well-developed and well-nourished.   HENT:   Head: Normocephalic and atraumatic.   Neurological: She is alert and oriented to person, place, and time.   Psychiatric: She has a normal mood and affect.  "Her speech is normal and behavior is normal. Judgment and thought content normal. Cognition and memory are normal.   Pleasant and engaging   Vitals reviewed.    Blood pressure 143/81, pulse 109, height 165.1 cm (65\"), weight 76.9 kg (169 lb 9.6 oz), not currently breastfeeding.    Medication List:   Current Outpatient Medications   Medication Sig Dispense Refill   • atenolol (TENORMIN) 25 MG tablet Take 12.5 mg by mouth Daily.     • buprenorphine-naloxone (SUBOXONE) 2-0.5 MG per SL tablet Place 0.5 tablets under the tongue Daily. To be taken along with suboxone 8 mg for total of 9 mg 21 tablet 0   • buprenorphine-naloxone (SUBOXONE) 8-2 MG per SL tablet Place 1 tablet under the tongue Daily. 42 tablet 0   • cyanocobalamin 1000 MCG/ML injection INJECT 1ML EVERY MONTH  2   • ibuprofen (ADVIL,MOTRIN) 600 MG tablet Take 600 mg by mouth Every 6 (Six) Hours As Needed for mild pain (1-3).     • levothyroxine (SYNTHROID, LEVOTHROID) 100 MCG tablet Take 100 mcg by mouth Daily.  2   • metoclopramide (REGLAN) 10 MG tablet   2   • omeprazole (PriLOSEC) 40 MG capsule Take 40 mg by mouth Daily.     • ondansetron (ZOFRAN) 8 MG tablet Take 8 mg by mouth Every 8 (Eight) Hours As Needed.     • PARoxetine (PAXIL) 10 MG tablet Take 1 tablet by mouth Every Morning. 30 tablet 2   • vitamin D (ERGOCALCIFEROL) 30926 units capsule capsule Take 50,000 Units by mouth Every 7 (Seven) Days.       No current facility-administered medications for this visit.        Mental Status Exam:   Hygiene:   good  Cooperation:  Cooperative  Eye Contact:  Good  Psychomotor Behavior:  Appropriate  Affect:  Full range  Hopelessness: Denies  Speech:  Normal  Thought Process:  Goal directed  Thought Content:  Normal  Suicidal:  None  Homicidal:  None  Hallucinations:  None  Delusion:  None  Memory:  Intact  Orientation:  Person, Place, Time and Situation  Reliability:  good  Insight:  Good  Judgement:  Good  Impulse Control:  Fair  Physical/Medical Issues:  Yes " hypothyroidism    Assessment/Plan   Problems Addressed this Visit     None      Visit Diagnoses     Opioid dependence on agonist therapy (CMS/HCC)    -  Primary    Relevant Medications    buprenorphine-naloxone (SUBOXONE) 8-2 MG per SL tablet    buprenorphine-naloxone (SUBOXONE) 2-0.5 MG per SL tablet    Moderate episode of recurrent major depressive disorder (CMS/HCC)        Relevant Medications    PARoxetine (PAXIL) 10 MG tablet    Panic disorder        Relevant Medications    PARoxetine (PAXIL) 10 MG tablet        Functionality: pt having minimal impairment in important areas of daily functioning.  Prognosis:  Guarded dependent on medication/follow up and treatment plan compliance.  Alexx reviewed and appropriate.  UDS performed today and is pending.  Previous ones reviewed and appropriate.    Pt attended therapy Feb 28, March 23, and April 17th.  She brought me in proof of them on her phone with letterhead.    Continuing the suboxone taper.  She will be on 9mg total.  Motivational interviewing performed.  Pt was praised for her efforts with taper.  He is to be nervous about tapering however she verbalizes that it is more psychological than physical.    Pt continues to progress in the MAT program.  Continues to verbalize the importance of maintaining sobriety.  She continues to be productive at her job.  She continues to have a positive outlook.  Patient was praised for her success.          Continuing efforts to promote the therapeutic alliance, address the patient's issues, and strengthen self awareness, insights, and coping skills..   Patient is agreeable to call the [facility] any worsening of symptoms.  She continues to be compliant with buprenorphine and medication assisted treatment.  Patient is aware to call 911 or go to the nearest ER should begin having SI/HI. RTC aprox 6 weeks.

## 2020-07-08 ENCOUNTER — OFFICE VISIT (OUTPATIENT)
Dept: PSYCHIATRY | Facility: CLINIC | Age: 40
End: 2020-07-08

## 2020-07-08 VITALS
HEIGHT: 65 IN | SYSTOLIC BLOOD PRESSURE: 126 MMHG | BODY MASS INDEX: 28.49 KG/M2 | TEMPERATURE: 98.4 F | WEIGHT: 171 LBS | DIASTOLIC BLOOD PRESSURE: 84 MMHG | HEART RATE: 90 BPM

## 2020-07-08 DIAGNOSIS — F41.0 PANIC DISORDER: ICD-10-CM

## 2020-07-08 DIAGNOSIS — F33.1 MODERATE EPISODE OF RECURRENT MAJOR DEPRESSIVE DISORDER (HCC): ICD-10-CM

## 2020-07-08 DIAGNOSIS — F11.20 OPIOID DEPENDENCE ON AGONIST THERAPY (HCC): Primary | ICD-10-CM

## 2020-07-08 PROCEDURE — 99214 OFFICE O/P EST MOD 30 MIN: CPT | Performed by: NURSE PRACTITIONER

## 2020-07-08 RX ORDER — BUPRENORPHINE HYDROCHLORIDE AND NALOXONE HYDROCHLORIDE DIHYDRATE 8; 2 MG/1; MG/1
1 TABLET SUBLINGUAL DAILY
Qty: 42 TABLET | Refills: 0 | Status: SHIPPED | OUTPATIENT
Start: 2020-07-08 | End: 2020-08-19

## 2020-07-08 NOTE — PROGRESS NOTES
Subjective   Adina Thomas is a 40 y.o. female is here today for medication management follow-up.    Chief Complaint recheck on buprenorphine treatment  History of Present Illness: Presents to the [facility] for medication follow up.  She states that she continues to do well.  She denies any depression or anxiety.  Denies any cravings.  Continuing to be very busy at her job which she says is very busy right now due to the COVID-19 pandemic.  She is sleeping well at night.  Mood is stable.  She did not have any problems with the last taper of the Suboxone.  States that she cannot even tell she went down.  No medical stressors.  No negative side effects to the medication.  She feels ready to continue further with the taper.  This does cause her a little anxiety to continue with the taper however she is more confident about it now.Body mass index is 28.46 kg/m². no appetite changes.           The following portions of the patient's history were reviewed and updated as appropriate: allergies, current medications, past medical history, past social history, past surgical history and problem list.    Review of Systems   Constitutional: Negative for activity change, appetite change and fatigue.   HENT: Negative for ear pain.    Eyes: Negative for visual disturbance.   Respiratory: Negative.    Cardiovascular: Negative.    Gastrointestinal: Negative for nausea.   Endocrine: Negative.    Genitourinary: Negative.    Musculoskeletal: Negative for arthralgias.   Skin: Negative.    Allergic/Immunologic: Negative.    Neurological: Negative for dizziness, seizures and headaches.   Hematological: Negative.    Psychiatric/Behavioral: Negative for agitation, behavioral problems, confusion, decreased concentration, dysphoric mood, hallucinations, self-injury, sleep disturbance and suicidal ideas. The patient is not nervous/anxious and is not hyperactive.        Objective   Physical Exam   Constitutional: She is  "oriented to person, place, and time. She appears well-developed and well-nourished.   HENT:   Head: Normocephalic and atraumatic.   Neurological: She is alert and oriented to person, place, and time.   Psychiatric: She has a normal mood and affect. Her speech is normal and behavior is normal. Judgment and thought content normal. Cognition and memory are normal.   Pleasant and engaging   Vitals reviewed.    Blood pressure 126/84, pulse 90, temperature 98.4 °F (36.9 °C), height 165.1 cm (65\"), weight 77.6 kg (171 lb), not currently breastfeeding.    Medication List:   Current Outpatient Medications   Medication Sig Dispense Refill   • atenolol (TENORMIN) 25 MG tablet Take 12.5 mg by mouth Daily.     • buprenorphine-naloxone (SUBOXONE) 8-2 MG per SL tablet Place 1 tablet under the tongue Daily. 42 tablet 0   • cyanocobalamin 1000 MCG/ML injection INJECT 1ML EVERY MONTH  2   • ibuprofen (ADVIL,MOTRIN) 600 MG tablet Take 600 mg by mouth Every 6 (Six) Hours As Needed for mild pain (1-3).     • levothyroxine (SYNTHROID, LEVOTHROID) 100 MCG tablet Take 100 mcg by mouth Daily.  2   • metoclopramide (REGLAN) 10 MG tablet   2   • omeprazole (PriLOSEC) 40 MG capsule Take 40 mg by mouth Daily.     • ondansetron (ZOFRAN) 8 MG tablet Take 8 mg by mouth Every 8 (Eight) Hours As Needed.     • PARoxetine (PAXIL) 10 MG tablet Take 1 tablet by mouth Every Morning. 30 tablet 2   • vitamin D (ERGOCALCIFEROL) 21373 units capsule capsule Take 50,000 Units by mouth Every 7 (Seven) Days.       No current facility-administered medications for this visit.        Mental Status Exam:   Hygiene:   good  Cooperation:  Cooperative  Eye Contact:  Good  Psychomotor Behavior:  Appropriate  Affect:  Full range  Hopelessness: Denies  Speech:  Normal  Thought Process:  Goal directed  Thought Content:  Normal  Suicidal:  None  Homicidal:  None  Hallucinations:  None  Delusion:  None  Memory:  Intact  Orientation:  Person, Place, Time and " Situation  Reliability:  good  Insight:  Good  Judgement:  Good  Impulse Control:  Fair  Physical/Medical Issues:  Yes hypothyroidism    Assessment/Plan   Problems Addressed this Visit     None      Visit Diagnoses     Opioid dependence on agonist therapy (CMS/HCC)    -  Primary    Relevant Medications    buprenorphine-naloxone (SUBOXONE) 8-2 MG per SL tablet    Moderate episode of recurrent major depressive disorder (CMS/HCC)        Panic disorder            Functionality: pt having minimal impairment in important areas of daily functioning.  Prognosis:  Guarded dependent on medication/follow up and treatment plan compliance.  Alexx reviewed and appropriate.  UDS performed today and is pending.  Previous ones reviewed and appropriate.  Pt has attended therapy on 5/26 and 7/1 and provided documentation.     Continuing the taper.  Pt is now going down to 8 mg.  We discussed this and we will stay at that dose for awhile as I do not want to push patient she has done so well.       Motivational interviewing performed.  Pt was praised for her efforts with taper.      Pt continues to progress in the MAT program.  Continues to verbalize the importance of maintaining sobriety.  She continues to be productive at her job.  She continues to have a positive outlook.           Continuing efforts to promote the therapeutic alliance, address the patient's issues, and strengthen self awareness, insights, and coping skills..   Patient is agreeable to call the [facility] any worsening of symptoms.  She continues to be compliant with buprenorphine and medication assisted treatment.  Patient is aware to call 911 or go to the nearest ER should begin having SI/HI. RTC aprox 6 weeks.

## 2020-08-19 ENCOUNTER — TELEPHONE (OUTPATIENT)
Dept: PSYCHIATRY | Facility: CLINIC | Age: 40
End: 2020-08-19

## 2020-08-19 ENCOUNTER — OFFICE VISIT (OUTPATIENT)
Dept: PSYCHIATRY | Facility: CLINIC | Age: 40
End: 2020-08-19

## 2020-08-19 VITALS
WEIGHT: 172.2 LBS | DIASTOLIC BLOOD PRESSURE: 79 MMHG | HEIGHT: 65 IN | HEART RATE: 79 BPM | BODY MASS INDEX: 28.69 KG/M2 | SYSTOLIC BLOOD PRESSURE: 141 MMHG | TEMPERATURE: 97.5 F

## 2020-08-19 DIAGNOSIS — F33.1 MODERATE EPISODE OF RECURRENT MAJOR DEPRESSIVE DISORDER (HCC): ICD-10-CM

## 2020-08-19 DIAGNOSIS — Z79.899 MEDICATION MANAGEMENT: ICD-10-CM

## 2020-08-19 DIAGNOSIS — F41.0 PANIC DISORDER: ICD-10-CM

## 2020-08-19 DIAGNOSIS — F11.20 OPIOID DEPENDENCE ON AGONIST THERAPY (HCC): Primary | ICD-10-CM

## 2020-08-19 PROCEDURE — 99214 OFFICE O/P EST MOD 30 MIN: CPT | Performed by: NURSE PRACTITIONER

## 2020-08-19 RX ORDER — BUPRENORPHINE AND NALOXONE 8; 2 MG/1; MG/1
1 FILM, SOLUBLE BUCCAL; SUBLINGUAL DAILY
Qty: 30 EACH | Refills: 0 | Status: SHIPPED | OUTPATIENT
Start: 2020-08-19 | End: 2020-08-20

## 2020-08-19 RX ORDER — PAROXETINE 10 MG/1
10 TABLET, FILM COATED ORAL EVERY MORNING
Qty: 30 TABLET | Refills: 2 | Status: SHIPPED | OUTPATIENT
Start: 2020-08-19 | End: 2020-09-30 | Stop reason: SDUPTHER

## 2020-08-19 NOTE — TELEPHONE ENCOUNTER
PA submitted via cover my meds. Awaiting determination.     Bin: [id number]  ADDIN: [id number]  ID: [id number]  Group: [id number]

## 2020-08-19 NOTE — PROGRESS NOTES
Subjective   Adina Thomas is a 40 y.o. female is here today for medication management follow-up.    Chief Complaint recheck on buprenorphine treatment  History of Present Illness: Presents to the [facility] for medication follow up.  She states that she continues to do well.  Only problem she is having is that the pill will not dissolve and takes over half an hour and is causing issues with her gumline.  She denies any depression or anxiety.  Denies any cravings.  Continuing to be very busy at her job which she says is very busy right now due to the COVID-19 pandemic.  She is sleeping well at night.  Mood is stable. Says this last taper did make her a little nervous and says this may be psychological.  Had no physical symptoms.   No negative side effects to the medication.  Body mass index is 28.66 kg/m². no appetite changes.  No medical stressors.  No panic attacks.  Denies any depression.  No SI.         The following portions of the patient's history were reviewed and updated as appropriate: allergies, current medications, past medical history, past social history, past surgical history and problem list.    Review of Systems   Constitutional: Negative for activity change, appetite change and fatigue.   HENT: Negative for ear pain.    Eyes: Negative for visual disturbance.   Respiratory: Negative.    Cardiovascular: Negative.    Gastrointestinal: Negative for nausea.   Endocrine: Negative.    Genitourinary: Negative.    Musculoskeletal: Negative for arthralgias.   Skin: Negative.    Allergic/Immunologic: Negative.    Neurological: Negative for dizziness, seizures and headaches.   Hematological: Negative.    Psychiatric/Behavioral: Negative for agitation, behavioral problems, confusion, decreased concentration, dysphoric mood, hallucinations, self-injury, sleep disturbance and suicidal ideas. The patient is nervous/anxious. The patient is not hyperactive.        Objective   Physical Exam  "  Constitutional: She is oriented to person, place, and time. She appears well-developed and well-nourished.   HENT:   Head: Normocephalic and atraumatic.   Neurological: She is alert and oriented to person, place, and time.   Psychiatric: She has a normal mood and affect. Her speech is normal and behavior is normal. Judgment and thought content normal. Cognition and memory are normal.   Pleasant and engaging   Vitals reviewed.    Blood pressure 141/79, pulse 79, temperature 97.5 °F (36.4 °C), height 165.1 cm (65\"), weight 78.1 kg (172 lb 3.2 oz), not currently breastfeeding.    Medication List:   Current Outpatient Medications   Medication Sig Dispense Refill   • atenolol (TENORMIN) 25 MG tablet Take 12.5 mg by mouth Daily.     • buprenorphine-naloxone (SUBOXONE) 8-2 MG film film Place 1 film under the tongue Daily. 30 each 0   • cyanocobalamin 1000 MCG/ML injection INJECT 1ML EVERY MONTH  2   • ibuprofen (ADVIL,MOTRIN) 600 MG tablet Take 600 mg by mouth Every 6 (Six) Hours As Needed for mild pain (1-3).     • levothyroxine (SYNTHROID, LEVOTHROID) 100 MCG tablet Take 100 mcg by mouth Daily.  2   • metoclopramide (REGLAN) 10 MG tablet   2   • omeprazole (PriLOSEC) 40 MG capsule Take 40 mg by mouth Daily.     • ondansetron (ZOFRAN) 8 MG tablet Take 8 mg by mouth Every 8 (Eight) Hours As Needed.     • PARoxetine (PAXIL) 10 MG tablet Take 1 tablet by mouth Every Morning. 30 tablet 2   • vitamin D (ERGOCALCIFEROL) 32543 units capsule capsule Take 50,000 Units by mouth Every 7 (Seven) Days.       No current facility-administered medications for this visit.        Mental Status Exam:   Hygiene:   good  Cooperation:  Cooperative  Eye Contact:  Good  Psychomotor Behavior:  Appropriate  Affect:  Full range  Hopelessness: Denies  Speech:  Normal  Thought Process:  Goal directed  Thought Content:  Normal  Suicidal:  None  Homicidal:  None  Hallucinations:  None  Delusion:  None  Memory:  Intact  Orientation:  Person, Place, " Time and Situation  Reliability:  good  Insight:  Good  Judgement:  Good  Impulse Control:  Fair  Physical/Medical Issues:  Yes hypothyroidism    Assessment/Plan   Problems Addressed this Visit     None      Visit Diagnoses     Opioid dependence on agonist therapy (CMS/[facility])    -  Primary    Relevant Medications    buprenorphine-naloxone (SUBOXONE) 8-2 MG film film    Panic disorder        Medication management            Functionality: pt having minimal impairment in important areas of daily functioning.  Prognosis:  Good  dependent on medication/follow up and treatment plan compliance.  Alexx reviewed and appropriate.  UDS performed today and is pending.  Previous ones reviewed and appropriate.  Pt has attended therapy on 8-13-20 and provided documentation.    I am changing her over to the film as she is having trouble with the pill dissolving.  We also discussed that her next taper with film will more than likely have to go to 6 mg.  She is OK for this whenever we decide to do it.  Pt has come a long way since I started with her as far as tapering.  She has been compliant in all areas of MAT treatment.      Continues to verbalize the importance of maintaining sobriety.  She continues to be productive at her job.  She continues to have a positive outlook.     Will have staff call pt next week in for random pill count and UDS        Continuing efforts to promote the therapeutic alliance, address the patient's issues, and strengthen self awareness, insights, and coping skills..   Patient is agreeable to call the [facility] any worsening of symptoms.  She continues to be compliant with buprenorphine and medication assisted treatment.  Patient is aware to call 911 or go to the nearest ER should begin having SI/HI. RTC aprox 6 weeks.            Answers for HPI/ROS submitted by the patient on 8/17/2020   What is the primary reason for your visit?: Other  Please describe your symptoms.: None  Have you had these  symptoms before?: Yes  How long have you been having these symptoms?: Greater than 2 weeks

## 2020-08-20 DIAGNOSIS — F11.20 OPIOID DEPENDENCE ON AGONIST THERAPY (HCC): Primary | ICD-10-CM

## 2020-08-20 DIAGNOSIS — F11.20 OPIOID DEPENDENCE ON AGONIST THERAPY (HCC): ICD-10-CM

## 2020-08-20 RX ORDER — BUPRENORPHINE HYDROCHLORIDE AND NALOXONE HYDROCHLORIDE DIHYDRATE 8; 2 MG/1; MG/1
1 TABLET SUBLINGUAL DAILY
Qty: 30 TABLET | Refills: 0 | Status: SHIPPED | OUTPATIENT
Start: 2020-08-20 | End: 2020-08-20 | Stop reason: SDUPTHER

## 2020-08-20 RX ORDER — BUPRENORPHINE HYDROCHLORIDE AND NALOXONE HYDROCHLORIDE DIHYDRATE 8; 2 MG/1; MG/1
1 TABLET SUBLINGUAL DAILY
Qty: 15 TABLET | Refills: 0 | Status: SHIPPED | OUTPATIENT
Start: 2020-08-20 | End: 2020-09-16 | Stop reason: SDUPTHER

## 2020-08-20 NOTE — TELEPHONE ENCOUNTER
Patient returned call and wanted me to let you know the pharmacy allowed her to cash pay for a 15 day supply of the suboxone films, so she doesn't need a 30 day supply of pills, only 15.

## 2020-08-20 NOTE — TELEPHONE ENCOUNTER
Ok call pharmacy and cancel the prescription I sent in set me know when you do and I will send in a 15 day supply

## 2020-08-20 NOTE — TELEPHONE ENCOUNTER
PA denied    Denied. We are unable to approve your request for this drug. The following criteria were not met: Documented allergy or side effect to the preferred agent Buprenorphine/Naloxone SL tabs (determined by skin test demonstrating hypersensitivity, objective documentation of allergy to menthol, physician witnessed side effect or allergy, or documentation of submission of allergic reaction or side effect to FDA MedWatch program). Current urine drug screen if established patient. Documentation of ongoing psychosocial counseling with demonstration of compliance along with submission of medical notes regarding treatment plan and psychosocial counseling. Date prescriber queried SYD within the past 90 days AND Prescriber restrictions requires a Licensed physician who qualifies for a waiver under the Drug Addiction Treatment Act (DATA) and who has been assigned a DEYSI (X) number. The records your doctor gave us did not show you met (any of) the requirements.

## 2020-08-27 ENCOUNTER — TELEPHONE (OUTPATIENT)
Dept: PSYCHIATRY | Facility: CLINIC | Age: 40
End: 2020-08-27

## 2020-08-27 NOTE — TELEPHONE ENCOUNTER
Patient was called in for random pill count of controlled medications and random UDS today per Shereen Calix. Patient arrived at the office within 4 hours and pill count was conducted and was correct.  See monitoring form scanned into chart.

## 2020-09-16 ENCOUNTER — TELEPHONE (OUTPATIENT)
Dept: PSYCHIATRY | Facility: CLINIC | Age: 40
End: 2020-09-16

## 2020-09-16 DIAGNOSIS — F11.20 OPIOID DEPENDENCE ON AGONIST THERAPY (HCC): ICD-10-CM

## 2020-09-16 RX ORDER — BUPRENORPHINE HYDROCHLORIDE AND NALOXONE HYDROCHLORIDE DIHYDRATE 8; 2 MG/1; MG/1
1 TABLET SUBLINGUAL DAILY
Qty: 14 TABLET | Refills: 0 | Status: SHIPPED | OUTPATIENT
Start: 2020-09-16 | End: 2020-09-30 | Stop reason: SDUPTHER

## 2020-09-16 NOTE — TELEPHONE ENCOUNTER
Tell her to get that in writing on letterhead from her dentist and I will submit that but until then it will have to be the tabs

## 2020-09-16 NOTE — TELEPHONE ENCOUNTER
Pt states that she was told to call today request  refills for her an remaining suboxone .      Pt was also wondering if we could do a PA on the films again .    She states that  at her recent dentist laura that the dentist made her aware that her mouth is been effected by letting the pills dissolve in her mouth , that's the reason she is having so much trouble with her bottom teeth .    Please advise

## 2020-09-16 NOTE — TELEPHONE ENCOUNTER
Pt states that she called her dentist office at  the office informed her the   Student had graduated that had been seen in  Her office , and there was no one that could write a letter for me   When ask if someone could read her xray and write her a letter after reviewing , pt  was told she had no xray in her records , I advised patient to call and ask for medical records to see if they could help her .

## 2020-09-17 NOTE — TELEPHONE ENCOUNTER
Call and tell her that from what Maile read the films are acidic just like the tablets.  We would have to have a letter from the dentist recommending switch to the film

## 2020-09-17 NOTE — TELEPHONE ENCOUNTER
I spoke with patient again this morning she has spoke with the manager for the Dentist office who made her aware once again she will not be able to get a letter due to her student graduating , could be up to 3 months before she could get an laura to be seen again

## 2020-09-30 ENCOUNTER — TELEMEDICINE (OUTPATIENT)
Dept: PSYCHIATRY | Facility: CLINIC | Age: 40
End: 2020-09-30

## 2020-09-30 DIAGNOSIS — F33.1 MODERATE EPISODE OF RECURRENT MAJOR DEPRESSIVE DISORDER (HCC): ICD-10-CM

## 2020-09-30 DIAGNOSIS — F41.0 PANIC DISORDER: ICD-10-CM

## 2020-09-30 DIAGNOSIS — F11.20 OPIOID DEPENDENCE ON AGONIST THERAPY (HCC): ICD-10-CM

## 2020-09-30 PROCEDURE — 99213 OFFICE O/P EST LOW 20 MIN: CPT | Performed by: NURSE PRACTITIONER

## 2020-09-30 RX ORDER — PAROXETINE 10 MG/1
10 TABLET, FILM COATED ORAL EVERY MORNING
Qty: 30 TABLET | Refills: 2 | Status: SHIPPED | OUTPATIENT
Start: 2020-09-30 | End: 2021-01-06 | Stop reason: SDUPTHER

## 2020-09-30 RX ORDER — BUPRENORPHINE HYDROCHLORIDE AND NALOXONE HYDROCHLORIDE DIHYDRATE 8; 2 MG/1; MG/1
1 TABLET SUBLINGUAL DAILY
Qty: 42 TABLET | Refills: 0 | Status: SHIPPED | OUTPATIENT
Start: 2020-09-30 | End: 2020-11-11

## 2020-09-30 NOTE — PROGRESS NOTES
Subjective   Patient ID: Adina Thomas is a 40 y.o. female Pt is being seen today via telemed through My Chart.  Provider is located at the office.      CC:; recheck on MAT and depression  History of Present Illness   Pt is having a lot of issues with her teeth.  She has been told by 2 dentists she needs all her top teeth pulled.  She has multiple issues.  Says the suboxone sticks in side of her mouth and will not dissolve due to her dry mouth and it has worsened the problem.  This has caused her high anxiety to think of not having upper teeth.  She denies any depression or panic attacks just a little increase in worry.  Sleeping well.  No cravings.  Continues to enjoy her job.  Continues with NA via zoom classes.  Mood is stable.  No negative side effects to the meds.      The following portions of the patient's history were reviewed and updated as appropriate: allergies, current medications, past family history, past medical history, past social history, past surgical history and problem list.    Review of Systems   Constitutional: Negative for activity change, appetite change and fatigue.   HENT: Negative.    Eyes: Negative for visual disturbance.   Respiratory: Negative.    Cardiovascular: Negative.    Gastrointestinal: Negative for nausea.   Endocrine: Negative.    Genitourinary: Negative.    Musculoskeletal: Negative for arthralgias.   Skin: Negative.    Allergic/Immunologic: Negative.    Neurological: Negative for dizziness, seizures and headaches.   Hematological: Negative.    Psychiatric/Behavioral: Negative for agitation, behavioral problems, confusion, decreased concentration, dysphoric mood, hallucinations, self-injury, sleep disturbance and suicidal ideas. The patient is nervous/anxious. The patient is not hyperactive.        Objective   Mental Status Exam  Appearance:  clean and casually dressed, appropriate  Attitude toward clinician:  cooperative and agreeable   Speech:    Rate:  regular rate and  rhythm   Volume:  normal  Motor:  no abnormal movements present  Mood:  appropriate  Affect:  euthymic  Thought Processes:  linear, logical, and goal directed  Thought Content:  normal  Suicidal Thoughts:  absent  Homicidal Thoughts:  absent  Perceptual Disturbance: no perceptual disturbance  Attention and Concentration:  good  Insight and Judgement:  good  Memory:  memory appears to be intact  Physical Exam  Constitutional:       Appearance: Normal appearance.   Neck:      Musculoskeletal: Normal range of motion.   Neurological:      General: No focal deficit present.      Mental Status: She is alert.   Psychiatric:         Attention and Perception: Attention normal.         Mood and Affect: Mood normal.         Speech: Speech normal.         Behavior: Behavior normal.         Thought Content: Thought content normal.         Cognition and Memory: Cognition normal.      Comments: Pleasant and cooperative       Lab Review:   not applicable  Assessment/Plan   Diagnoses and all orders for this visit:    Moderate episode of recurrent major depressive disorder (CMS/HCC)  -     PARoxetine (PAXIL) 10 MG tablet; Take 1 tablet by mouth Every Morning.    Panic disorder  -     PARoxetine (PAXIL) 10 MG tablet; Take 1 tablet by mouth Every Morning.    Opioid dependence on agonist therapy (CMS/HCC)  -     buprenorphine-naloxone (SUBOXONE) 8-2 MG per SL tablet; Place 1 tablet under the tongue Daily.    continue her on present treatment.  I discussed with patient the suboxone film could have the same effect.  She is going to try and see if the dentist will write me a letter suggesting she be switched to the film.  I told her that was the only way to possibly het that approved through her insurance.  She also understands this may not even help her problem.  Pt continues to be compliant in the MAT program.  Will continue to further decrease her suboxone dosage at a future visit.    RTC 6 weeks.    No follow-ups on file.

## 2020-11-11 ENCOUNTER — OFFICE VISIT (OUTPATIENT)
Dept: PSYCHIATRY | Facility: CLINIC | Age: 40
End: 2020-11-11

## 2020-11-11 VITALS
SYSTOLIC BLOOD PRESSURE: 124 MMHG | BODY MASS INDEX: 29.06 KG/M2 | HEART RATE: 107 BPM | TEMPERATURE: 97.3 F | WEIGHT: 174.4 LBS | HEIGHT: 65 IN | DIASTOLIC BLOOD PRESSURE: 80 MMHG

## 2020-11-11 DIAGNOSIS — F11.20 OPIOID DEPENDENCE ON AGONIST THERAPY (HCC): Primary | ICD-10-CM

## 2020-11-11 DIAGNOSIS — F41.0 PANIC DISORDER: ICD-10-CM

## 2020-11-11 PROCEDURE — 99214 OFFICE O/P EST MOD 30 MIN: CPT | Performed by: NURSE PRACTITIONER

## 2020-11-11 RX ORDER — PAROXETINE 10 MG/1
10 TABLET, FILM COATED ORAL DAILY
Qty: 30 TABLET | Refills: 2
Start: 2020-11-11 | End: 2021-01-06

## 2020-11-11 RX ORDER — BUPRENORPHINE AND NALOXONE 8; 2 MG/1; MG/1
1 FILM, SOLUBLE BUCCAL; SUBLINGUAL DAILY
Qty: 30 EACH | Refills: 0 | Status: SHIPPED | OUTPATIENT
Start: 2020-11-11 | End: 2020-12-10 | Stop reason: SDUPTHER

## 2020-11-11 NOTE — PROGRESS NOTES
Subjective   Adina Thomas is a 40 y.o. female is here today for medication management follow-up.    Chief Complaint recheck on buprenorphine treatment  History of Present Illness: Presents to the [facility] for medication follow up.  She continues to do well.  Her main issue at the present is the dry mouth issues causing severe issues with her teeth.  We have submitted a letter she provided from  dental to switch pt to the suboxone strips and it was denied.  Sending in an appeal today.  The suboxone pills will not dissolve and are causing more dentition issues.  She has been told she will have to have upper teeth pulled and possible bone grafting performed.  This causes her anxiety as she says she cannot stand the thought of having her teeth pulled.  The work up is ongoing.  She denies panic attacks yet does worry over this as well as the cost which would be around $[id number].  She denies any cravings. Sleeping well.  Body mass index is 29.02 kg/m². no appetite changes.  No depression.  Continues to work full time.  Mood is stable.             The following portions of the patient's history were reviewed and updated as appropriate: allergies, current medications, past medical history, past social history, past surgical history and problem list.    Review of Systems   Constitutional: Negative for activity change, appetite change and fatigue.   HENT: Negative for ear pain.    Eyes: Negative for visual disturbance.   Respiratory: Negative.    Cardiovascular: Negative.    Gastrointestinal: Negative for nausea.   Endocrine: Negative.    Genitourinary: Negative.    Musculoskeletal: Negative for arthralgias.   Skin: Negative.    Allergic/Immunologic: Negative.    Neurological: Negative for dizziness, seizures and headaches.   Hematological: Negative.    Psychiatric/Behavioral: Negative for agitation, behavioral problems, confusion, decreased concentration, dysphoric mood, hallucinations, self-injury,  "sleep disturbance and suicidal ideas. The patient is nervous/anxious. The patient is not hyperactive.        Objective   Physical Exam   Constitutional: She is oriented to person, place, and time. She appears well-developed.   HENT:   Head: Normocephalic and atraumatic.   Neurological: She is alert and oriented to person, place, and time.   Psychiatric: Her speech is normal and behavior is normal. Judgment and thought content normal.   Pleasant and engaging   Vitals reviewed.    Blood pressure 124/80, pulse 107, temperature 97.3 °F (36.3 °C), height 165.1 cm (65\"), weight 79.1 kg (174 lb 6.4 oz), not currently breastfeeding.    Medication List:   Current Outpatient Medications   Medication Sig Dispense Refill   • atenolol (TENORMIN) 25 MG tablet Take 12.5 mg by mouth Daily.     • buprenorphine-naloxone (SUBOXONE) 8-2 MG film film Place 1 film under the tongue Daily. 30 each 0   • cyanocobalamin 1000 MCG/ML injection INJECT 1ML EVERY MONTH  2   • ibuprofen (ADVIL,MOTRIN) 600 MG tablet Take 600 mg by mouth Every 6 (Six) Hours As Needed for mild pain (1-3).     • levothyroxine (SYNTHROID, LEVOTHROID) 100 MCG tablet Take 100 mcg by mouth Daily.  2   • metoclopramide (REGLAN) 10 MG tablet   2   • omeprazole (PriLOSEC) 40 MG capsule Take 40 mg by mouth Daily.     • ondansetron (ZOFRAN) 8 MG tablet Take 8 mg by mouth Every 8 (Eight) Hours As Needed.     • PARoxetine (PAXIL) 10 MG tablet Take 1 tablet by mouth Every Morning. 30 tablet 2   • PARoxetine (Paxil) 10 MG tablet Take 1 tablet by mouth Daily for 30 days. 30 tablet 2   • vitamin D (ERGOCALCIFEROL) 63375 units capsule capsule Take 50,000 Units by mouth Every 7 (Seven) Days.       No current facility-administered medications for this visit.        Mental Status Exam:   Hygiene:   good  Cooperation:  Cooperative  Eye Contact:  Good  Psychomotor Behavior:  Appropriate  Affect:  Full range  Hopelessness: Denies  Speech:  Normal  Thought Process:  Goal directed  Thought " Content:  Normal  Suicidal:  None  Homicidal:  None  Hallucinations:  None  Delusion:  None  Memory:  Intact  Orientation:  Person, Place, Time and Situation  Reliability:  good  Insight:  Good  Judgement:  Good  Impulse Control:  Fair  Physical/Medical Issues:  Yes hypothyroidism    Assessment/Plan   Problems Addressed this Visit     None      Visit Diagnoses     Opioid dependence on agonist therapy (CMS/HCC)    -  Primary    Relevant Medications    buprenorphine-naloxone (SUBOXONE) 8-2 MG film film    Panic disorder        Relevant Medications    PARoxetine (Paxil) 10 MG tablet      Diagnoses       Codes Comments    Opioid dependence on agonist therapy (CMS/HCC)    -  Primary ICD-10-CM: F11.20  ICD-9-CM: 304.00     Panic disorder     ICD-10-CM: F41.0  ICD-9-CM: 300.01         Functionality: pt having minimal impairment in important areas of daily functioning.  Prognosis:  Good  dependent on medication/follow up and treatment plan compliance.  Alexx reviewed and appropriate.  UDS performed today and is pending.  Previous ones reviewed and appropriate.  Pt has brought in monthly documentation of counseling visits scanned into epic.    She would like me to send in the strips vs the pills. See above.  She says she will pay for this months supply and hopefully the appeal will go through.  Switching to the strips would also help with future taper in her case as it has to be done slow or she experiences side effects.            Continuing efforts to promote the therapeutic alliance, address the patient's issues, and strengthen self awareness, insights, and coping skills..   Patient is agreeable to call the [facility] any worsening of symptoms.  She continues to be compliant with buprenorphine and medication assisted treatment.  Patient is aware to call 911 or go to the nearest ER should begin having SI/HI. RTC aprox 8 weeks.

## 2020-11-11 NOTE — PLAN OF CARE
Adina has made substantial improvement in the MAT program.  She originally was on zubsolv 11mg when she started with me.  Zubsolv is much stronger strength than suboxone.  She has tapered down to 8mg suboxone.  This is a substantial drop.  Long term goal is to continue very slow taper and get down to 4mg at least if not coming off the med entirely.  She continues to be compliant with counseling appointments and has not had any abnormal urine drug screens.  She is working at a job and thriving actually.  Long term treatment with suboxone is necessary to prevent relapse of addiction.

## 2020-11-11 NOTE — TREATMENT PLAN
Multi-Disciplinary Problems (from Behavioral Health Treatment Plan)    Active Problems     Problem: Substance Abuse Issues  Start Date: 11/11/20    Problem Details: The patient self-scales this problem as a 3 with 10 being the worst.        Goal Priority Start Date Expected End Date End Date    Patient will abstain from mood altering substances. -- 11/11/20 -- --    Goal Details: Progress toward goal:  The patient self-scales their progress related to this goal as a 3 with 10 being the worst.        Goal Intervention Frequency Start Date End Date    Provide education to increase patients understanding of addiction and relapse processes. Weekly 11/11/20 --    Intervention Details: Duration of treatment until until remission of symptoms.        Goal Priority Start Date Expected End Date End Date    Patient will develop insight into how to improve their relationships. -- 11/11/20 -- --    Goal Details: Progress toward goal:  The patient self-scales their progress related to this goal as a 3 with 10 being the worst.        Goal Intervention Frequency Start Date End Date    Educate about 12 step recovery programs. Weekly 11/11/20 --    Intervention Details: Duration of treatment until until remission of symptoms.        Goal Priority Start Date Expected End Date End Date    Patient will improve positive self regard. -- 11/11/20 -- --    Goal Details: Progress toward goal:  The patient self-scales their progress related to this goal as a 3 with 10 being the worst.        Goal Intervention Frequency Start Date End Date    Educate patient about how substance use affects their relationships. Weekly 11/11/20 --    Intervention Details: Duration of treatment until until remission of symptoms.                           I have discussed and reviewed this treatment plan with the patient.

## 2020-11-16 ENCOUNTER — TELEPHONE (OUTPATIENT)
Dept: PSYCHIATRY | Facility: CLINIC | Age: 40
End: 2020-11-16

## 2020-12-10 ENCOUNTER — TELEPHONE (OUTPATIENT)
Dept: PSYCHIATRY | Facility: CLINIC | Age: 40
End: 2020-12-10

## 2020-12-10 DIAGNOSIS — F11.20 OPIOID DEPENDENCE ON AGONIST THERAPY (HCC): ICD-10-CM

## 2020-12-10 RX ORDER — BUPRENORPHINE AND NALOXONE 8; 2 MG/1; MG/1
1 FILM, SOLUBLE BUCCAL; SUBLINGUAL DAILY
Qty: 30 EACH | Refills: 0 | Status: SHIPPED | OUTPATIENT
Start: 2020-12-10 | End: 2021-01-06 | Stop reason: SDUPTHER

## 2021-01-06 ENCOUNTER — OFFICE VISIT (OUTPATIENT)
Dept: PSYCHIATRY | Facility: CLINIC | Age: 41
End: 2021-01-06

## 2021-01-06 VITALS
HEIGHT: 65 IN | BODY MASS INDEX: 28.76 KG/M2 | DIASTOLIC BLOOD PRESSURE: 78 MMHG | TEMPERATURE: 96.9 F | HEART RATE: 90 BPM | WEIGHT: 172.6 LBS | SYSTOLIC BLOOD PRESSURE: 124 MMHG

## 2021-01-06 DIAGNOSIS — F33.1 MODERATE EPISODE OF RECURRENT MAJOR DEPRESSIVE DISORDER (HCC): ICD-10-CM

## 2021-01-06 DIAGNOSIS — F41.0 PANIC DISORDER: ICD-10-CM

## 2021-01-06 DIAGNOSIS — F11.20 OPIOID DEPENDENCE ON AGONIST THERAPY (HCC): Primary | ICD-10-CM

## 2021-01-06 PROCEDURE — 99214 OFFICE O/P EST MOD 30 MIN: CPT | Performed by: NURSE PRACTITIONER

## 2021-01-06 RX ORDER — PAROXETINE 10 MG/1
10 TABLET, FILM COATED ORAL EVERY MORNING
Qty: 30 TABLET | Refills: 2 | Status: SHIPPED | OUTPATIENT
Start: 2021-01-06

## 2021-01-06 RX ORDER — BUPRENORPHINE AND NALOXONE 8; 2 MG/1; MG/1
1 FILM, SOLUBLE BUCCAL; SUBLINGUAL DAILY
Qty: 30 EACH | Refills: 0 | Status: SHIPPED | OUTPATIENT
Start: 2021-01-06 | End: 2021-02-04 | Stop reason: SDUPTHER

## 2021-01-06 NOTE — PROGRESS NOTES
Subjective   Adina Thomas is a 40 y.o. female is here today for medication management follow-up.    Chief Complaint recheck on buprenorphine treatment  History of Present Illness: Presents to the [facility] for medication follow up.  She continues to do well. She says the suboxone films are working better for her vs the tablets with her dental issue.  She has an upcoming appointment with the dentist.  She is looking at pricing options for the removal of her teeth.  She denies any depression or anxiety except for situational anxiety regarding her teeth.  She has had recent stresser in the loss of her grandfather, her grandmother has had a heart attack.  She denies any panic attacks.  She continues with therapy at [facility]. Denies any cravings.    Body mass index is 28.72 kg/m². no appetite changes.  Mood is stable.  Continues to work full time.  Work is stressful in that so many people are trying to get assistance with their bills due to covid.              The following portions of the patient's history were reviewed and updated as appropriate: allergies, current medications, past medical history, past social history, past surgical history and problem list.    Review of Systems   Constitutional: Negative for activity change, appetite change and fatigue.   HENT: Negative for ear pain.    Eyes: Negative for visual disturbance.   Respiratory: Negative.    Cardiovascular: Negative.    Gastrointestinal: Negative for nausea.   Endocrine: Negative.    Genitourinary: Negative.    Musculoskeletal: Negative for arthralgias.   Skin: Negative.    Allergic/Immunologic: Negative.    Neurological: Negative for dizziness, seizures and headaches.   Hematological: Negative.    Psychiatric/Behavioral: Negative for agitation, behavioral problems, confusion, decreased concentration, dysphoric mood, hallucinations, self-injury, sleep disturbance and suicidal ideas. The patient is nervous/anxious. The patient is  "not hyperactive.        Objective   Physical Exam   Constitutional: She is oriented to person, place, and time. She appears well-developed.   HENT:   Head: Normocephalic and atraumatic.   Neurological: She is alert and oriented to person, place, and time.   Psychiatric: Her speech is normal and behavior is normal. Judgment and thought content normal.   Pleasant and engaging   Vitals reviewed.    Blood pressure 124/78, pulse 90, temperature 96.9 °F (36.1 °C), height 165.1 cm (65\"), weight 78.3 kg (172 lb 9.6 oz), not currently breastfeeding.    Medication List:   Current Outpatient Medications   Medication Sig Dispense Refill   • atenolol (TENORMIN) 25 MG tablet Take 12.5 mg by mouth Daily.     • buprenorphine-naloxone (SUBOXONE) 8-2 MG film film Place 1 film under the tongue Daily. 30 each 0   • cyanocobalamin 1000 MCG/ML injection INJECT 1ML EVERY MONTH  2   • ibuprofen (ADVIL,MOTRIN) 600 MG tablet Take 600 mg by mouth Every 6 (Six) Hours As Needed for mild pain (1-3).     • levothyroxine (SYNTHROID, LEVOTHROID) 100 MCG tablet Take 100 mcg by mouth Daily.  2   • metoclopramide (REGLAN) 10 MG tablet   2   • omeprazole (PriLOSEC) 40 MG capsule Take 40 mg by mouth Daily.     • ondansetron (ZOFRAN) 8 MG tablet Take 8 mg by mouth Every 8 (Eight) Hours As Needed.     • PARoxetine (PAXIL) 10 MG tablet Take 1 tablet by mouth Every Morning. 30 tablet 2   • vitamin D (ERGOCALCIFEROL) 52503 units capsule capsule Take 50,000 Units by mouth Every 7 (Seven) Days.       No current facility-administered medications for this visit.        Mental Status Exam:   Hygiene:   good  Cooperation:  Cooperative  Eye Contact:  Good  Psychomotor Behavior:  Appropriate  Affect:  Full range  Hopelessness: Denies  Speech:  Normal  Thought Process:  Goal directed  Thought Content:  Normal  Suicidal:  None  Homicidal:  None  Hallucinations:  None  Delusion:  None  Memory:  Intact  Orientation:  Person, Place, Time and Situation  Reliability:  " good  Insight:  Good  Judgement:  Good  Impulse Control:  Fair  Physical/Medical Issues:  Yes hypothyroidism    Assessment/Plan   Problems Addressed this Visit     None      Visit Diagnoses     Opioid dependence on agonist therapy (CMS/HCC)    -  Primary    Relevant Medications    buprenorphine-naloxone (SUBOXONE) 8-2 MG film film    Panic disorder        Relevant Medications    PARoxetine (PAXIL) 10 MG tablet    Moderate episode of recurrent major depressive disorder (CMS/HCC)        Relevant Medications    PARoxetine (PAXIL) 10 MG tablet      Diagnoses       Codes Comments    Opioid dependence on agonist therapy (CMS/HCC)    -  Primary ICD-10-CM: F11.20  ICD-9-CM: 304.00     Panic disorder     ICD-10-CM: F41.0  ICD-9-CM: 300.01     Moderate episode of recurrent major depressive disorder (CMS/HCC)     ICD-10-CM: F33.1  ICD-9-CM: 296.32         Functionality: pt having minimal impairment in important areas of daily functioning.  Prognosis:  Good  dependent on medication/follow up and treatment plan compliance.  Alexx reviewed and appropriate.  UDS performed today and is pending.  Previous ones reviewed and appropriate.  Pt has brought in monthly documentation of counseling visits scanned into epic.      We had lengthy discussion about tapering.  With pts current stress she would like to wait and try the taper at the next visit.  Pt has done extremely well and been totally compliant.  I told her that would be fine.  For now continue the suboxone at 8mg for the opioid use disorder and the paxil for anxiety and panic.  She is to continue therapy. She continues to be productive and working full time.  Compliant with MAT treatment.  Refills have been submitted.          Continuing efforts to promote the therapeutic alliance, address the patient's issues, and strengthen self awareness, insights, and coping skills..   Patient is agreeable to call the [facility] any worsening of symptoms.  She continues to be compliant  with buprenorphine and medication assisted treatment.  Patient is aware to call 911 or go to the nearest ER should begin having SI/HI. RTC aprox 8 weeks.

## 2021-02-04 DIAGNOSIS — F11.20 OPIOID DEPENDENCE ON AGONIST THERAPY (HCC): ICD-10-CM

## 2021-02-04 RX ORDER — BUPRENORPHINE AND NALOXONE 8; 2 MG/1; MG/1
1 FILM, SOLUBLE BUCCAL; SUBLINGUAL DAILY
Qty: 30 EACH | Refills: 0 | Status: SHIPPED | OUTPATIENT
Start: 2021-02-04 | End: 2021-03-04 | Stop reason: SDUPTHER

## 2021-03-04 DIAGNOSIS — F11.20 OPIOID DEPENDENCE ON AGONIST THERAPY (HCC): ICD-10-CM

## 2021-03-04 RX ORDER — BUPRENORPHINE AND NALOXONE 8; 2 MG/1; MG/1
1 FILM, SOLUBLE BUCCAL; SUBLINGUAL DAILY
Qty: 3 EACH | Refills: 0 | Status: SHIPPED | OUTPATIENT
Start: 2021-03-04 | End: 2021-03-10 | Stop reason: SDUPTHER

## 2021-03-10 ENCOUNTER — OFFICE VISIT (OUTPATIENT)
Dept: PSYCHIATRY | Facility: CLINIC | Age: 41
End: 2021-03-10

## 2021-03-10 VITALS
WEIGHT: 175.2 LBS | BODY MASS INDEX: 29.19 KG/M2 | SYSTOLIC BLOOD PRESSURE: 134 MMHG | HEART RATE: 94 BPM | TEMPERATURE: 97.2 F | HEIGHT: 65 IN | DIASTOLIC BLOOD PRESSURE: 79 MMHG

## 2021-03-10 DIAGNOSIS — F11.20 OPIOID DEPENDENCE ON AGONIST THERAPY (HCC): Primary | ICD-10-CM

## 2021-03-10 DIAGNOSIS — F41.0 PANIC DISORDER: ICD-10-CM

## 2021-03-10 PROCEDURE — 99214 OFFICE O/P EST MOD 30 MIN: CPT | Performed by: NURSE PRACTITIONER

## 2021-03-10 RX ORDER — BUPRENORPHINE AND NALOXONE 8; 2 MG/1; MG/1
FILM, SOLUBLE BUCCAL; SUBLINGUAL
Qty: 15 EACH | Refills: 0 | Status: SHIPPED | OUTPATIENT
Start: 2021-03-10 | End: 2021-04-05 | Stop reason: SDUPTHER

## 2021-03-10 RX ORDER — BUPRENORPHINE AND NALOXONE 12; 3 MG/1; MG/1
FILM, SOLUBLE BUCCAL; SUBLINGUAL
Qty: 7 EACH | Refills: 0 | Status: SHIPPED | OUTPATIENT
Start: 2021-03-10 | End: 2021-04-05 | Stop reason: SDUPTHER

## 2021-03-10 NOTE — PROGRESS NOTES
Subjective   Adina Thomas is a 40 y.o. female is here today for medication management follow-up. Student rishi rachel present for the visit with patient's permission.      Chief Complaint recheck on buprenorphine treatment and panic  History of Present Illness: Presents to the [facility] for medication follow up.  Patient has had some recent stressors with her daughter as well as problems with a supervisor at work but states she is trying to manage it.  She denies any cravings.  Denies any depression.  No panic attacks.  Anxiety is situational.  She is sleeping well at night without difficulty.  No current medical stressors.  Mood is stable.Body mass index is 29.15 kg/m².  She is anxious about tapering but wants to try it.              The following portions of the patient's history were reviewed and updated as appropriate: allergies, current medications, past medical history, past social history, past surgical history and problem list.    Review of Systems   Constitutional: Negative for activity change, appetite change and fatigue.   HENT: Negative for ear pain.    Eyes: Negative for visual disturbance.   Respiratory: Negative.    Cardiovascular: Negative.    Gastrointestinal: Negative for nausea.   Endocrine: Negative.    Genitourinary: Negative.    Musculoskeletal: Negative for arthralgias.   Skin: Negative.    Allergic/Immunologic: Negative.    Neurological: Negative for dizziness, seizures and headaches.   Hematological: Negative.    Psychiatric/Behavioral: Negative for agitation, behavioral problems, confusion, decreased concentration, dysphoric mood, hallucinations, self-injury, sleep disturbance and suicidal ideas. The patient is nervous/anxious. The patient is not hyperactive.        Objective   Physical Exam   Constitutional: She is oriented to person, place, and time. She appears well-developed.   HENT:   Head: Normocephalic and atraumatic.   Neurological: She is alert and oriented to  "person, place, and time.   Psychiatric: Her speech is normal and behavior is normal. Judgment and thought content normal.   Pleasant and engaging   Vitals reviewed.    Blood pressure 134/79, pulse 94, temperature 97.2 °F (36.2 °C), height 165.1 cm (65\"), weight 79.5 kg (175 lb 3.2 oz), not currently breastfeeding.    Medication List:   Current Outpatient Medications   Medication Sig Dispense Refill   • atenolol (TENORMIN) 25 MG tablet Take 12.5 mg by mouth Daily.     • buprenorphine-naloxone (SUBOXONE) 8-2 MG film film Use one film every other day.  Alternate with 1/2 of the 12 mg 15 each 0   • cyanocobalamin 1000 MCG/ML injection INJECT 1ML EVERY MONTH  2   • ibuprofen (ADVIL,MOTRIN) 600 MG tablet Take 600 mg by mouth Every 6 (Six) Hours As Needed for mild pain (1-3).     • levothyroxine (SYNTHROID, LEVOTHROID) 100 MCG tablet Take 100 mcg by mouth Daily.  2   • metoclopramide (REGLAN) 10 MG tablet   2   • omeprazole (PriLOSEC) 40 MG capsule Take 40 mg by mouth Daily.     • ondansetron (ZOFRAN) 8 MG tablet Take 8 mg by mouth Every 8 (Eight) Hours As Needed.     • PARoxetine (PAXIL) 10 MG tablet Take 1 tablet by mouth Every Morning. 30 tablet 2   • vitamin D (ERGOCALCIFEROL) 27364 units capsule capsule Take 50,000 Units by mouth Every 7 (Seven) Days.     • buprenorphine-naloxone (SUBOXONE) 12-3 MG film sublingual film Use 1/2 film every other day alternating with the 8 mg 7 each 0     No current facility-administered medications for this visit.       Mental Status Exam:   Hygiene:   good  Cooperation:  Cooperative  Eye Contact:  Good  Psychomotor Behavior:  Appropriate  Affect:  Full range  Hopelessness: Denies  Speech:  Normal  Thought Process:  Goal directed  Thought Content:  Normal  Suicidal:  None  Homicidal:  None  Hallucinations:  None  Delusion:  None  Memory:  Intact  Orientation:  Person, Place, Time and Situation  Reliability:  good  Insight:  Good  Judgement:  Good  Impulse Control:  " Fair  Physical/Medical Issues:  Yes hypothyroidism    Assessment/Plan   Problems Addressed this Visit     None      Visit Diagnoses     Opioid dependence on agonist therapy (CMS/HCC)    -  Primary    Relevant Medications    buprenorphine-naloxone (SUBOXONE) 8-2 MG film film    buprenorphine-naloxone (SUBOXONE) 12-3 MG film sublingual film    Panic disorder        Relevant Medications    buprenorphine-naloxone (SUBOXONE) 12-3 MG film sublingual film      Diagnoses       Codes Comments    Opioid dependence on agonist therapy (CMS/HCC)    -  Primary ICD-10-CM: F11.20  ICD-9-CM: 304.00     Panic disorder     ICD-10-CM: F41.0  ICD-9-CM: 300.01         Functionality: pt having minimal impairment in important areas of daily functioning.  Prognosis:  Good  dependent on medication/follow up and treatment plan compliance.  Alexx reviewed and appropriate.  UDS performed today and is pending.  Previous ones reviewed and appropriate.  Pt emailed me copies of her comp care visits.  Unable to print them due to malfunction in computer.  She went to  on 1/28/21.      Taper the suboxone slightly.  Going to have her use 8mg alternating with 6 mg (half of a 12 mg) every other day.  Pt is agreeable to the taper.  Motivational interviewing performed.          Continuing efforts to promote the therapeutic alliance, address the patient's issues, and strengthen self awareness, insights, and coping skills..   Patient is agreeable to call the [facility] any worsening of symptoms.  She continues to be compliant with buprenorphine and medication assisted treatment.  Patient is aware to call 911 or go to the nearest ER should begin having SI/HI. RTC aprox 8 weeks.

## 2021-03-23 ENCOUNTER — BULK ORDERING (OUTPATIENT)
Dept: CASE MANAGEMENT | Facility: OTHER | Age: 41
End: 2021-03-23

## 2021-03-23 DIAGNOSIS — Z23 IMMUNIZATION DUE: ICD-10-CM

## 2021-04-05 ENCOUNTER — TELEPHONE (OUTPATIENT)
Dept: PSYCHIATRY | Facility: CLINIC | Age: 41
End: 2021-04-05

## 2021-04-05 DIAGNOSIS — F41.0 PANIC DISORDER: ICD-10-CM

## 2021-04-05 DIAGNOSIS — F11.20 OPIOID DEPENDENCE ON AGONIST THERAPY (HCC): ICD-10-CM

## 2021-04-05 RX ORDER — BUPRENORPHINE AND NALOXONE 12; 3 MG/1; MG/1
FILM, SOLUBLE BUCCAL; SUBLINGUAL
Qty: 7 EACH | Refills: 0 | Status: SHIPPED | OUTPATIENT
Start: 2021-04-05

## 2021-04-05 RX ORDER — BUPRENORPHINE AND NALOXONE 8; 2 MG/1; MG/1
FILM, SOLUBLE BUCCAL; SUBLINGUAL
Qty: 15 EACH | Refills: 0 | Status: SHIPPED | OUTPATIENT
Start: 2021-04-05

## 2021-04-05 NOTE — TELEPHONE ENCOUNTER
Patient calls and says since alternating the 8mg and 6mg suboxone she is doing well however she will not have enough 6mg to take on Thursday as it worked out but has enough 8mg for her Friday dose. She wants to know if you want to send in more 6mg now or if she should take 8mg 2 days in a row and start the new script on Friday?

## 2021-04-14 ENCOUNTER — TELEPHONE (OUTPATIENT)
Dept: PSYCHIATRY | Facility: CLINIC | Age: 41
End: 2021-04-14

## 2021-04-14 NOTE — TELEPHONE ENCOUNTER
Patient calls in and just wants to let provider and staff know she will be cancelling any future appointments as she has switched providers. Patient says she is working in [place] and is unable to make it to her appointments with [facility] without missing work. She has major dental work scheduled soon and will also have to miss a lot for this. She has found another clinic closer to home that is more convenient for her work schedule. Patient says she was actually able to be worked in at that clinic on Friday and the pharmacy voided the last script sent in by [facility] for Suboxone on 04/05/2021 because her new provider at [facility] sent in medication for her also. I have made [facility] aware and cancelled all upcoming appointments for patient.

## 2021-04-15 ENCOUNTER — APPOINTMENT (OUTPATIENT)
Dept: VACCINE CLINIC | Facility: HOSPITAL | Age: 41
End: 2021-04-15

## 2021-04-16 ENCOUNTER — IMMUNIZATION (OUTPATIENT)
Dept: VACCINE CLINIC | Facility: HOSPITAL | Age: 41
End: 2021-04-16

## 2021-04-16 PROCEDURE — 91300 HC SARSCOV02 VAC 30MCG/0.3ML IM: CPT | Performed by: INTERNAL MEDICINE

## 2021-04-16 PROCEDURE — 0001A: CPT | Performed by: INTERNAL MEDICINE

## 2021-05-07 ENCOUNTER — IMMUNIZATION (OUTPATIENT)
Dept: VACCINE CLINIC | Facility: HOSPITAL | Age: 41
End: 2021-05-07

## 2021-05-07 PROCEDURE — 0002A: CPT | Performed by: INTERNAL MEDICINE

## 2021-05-07 PROCEDURE — 91300 HC SARSCOV02 VAC 30MCG/0.3ML IM: CPT | Performed by: INTERNAL MEDICINE

## 2025-05-05 PROBLEM — R00.2 PALPITATIONS: Status: ACTIVE | Noted: 2025-05-05

## 2025-05-05 PROBLEM — E78.5 HYPERLIPIDEMIA LDL GOAL <100: Status: ACTIVE | Noted: 2025-05-05

## 2025-05-05 PROBLEM — I87.2 CHRONIC VENOUS INSUFFICIENCY OF LOWER EXTREMITY: Status: ACTIVE | Noted: 2025-05-05

## 2025-05-05 NOTE — ASSESSMENT & PLAN NOTE
S/P vein ablation  Stable.  30 mm/hg knee high compression stockings.  Leg elevation x 30 minutes 3 times daily.  Increase your  walking schedule and distance covered.  Weight loss diet.

## 2025-05-05 NOTE — ASSESSMENT & PLAN NOTE
Needs labs    Orders:    Hepatic Function Panel; Future    High Sensitivity CRP; Future    Lipoprotein A (LPA); Future    Lipid Panel; Future

## 2025-05-05 NOTE — PROGRESS NOTES
Cardiology Established Patient Note     Name: Adina Thomas  :   1980  PCP: Mickey Park MD  Date:   2025  Department: [facility] CARDIOLOGY  [address]  [facility] [address]  Fax [phone]  Phone [phone]    Chief Complaint:      Subjective     History of Present Illness  Adina Thomas is a 44 y.o. female who presents today for follow up.   She is co extreme fatigue during the day.Falling a sleep during the day.. Occasional ill defined retrosternal chest discomfort unrelated to activity.  Palpitations resolved.  Abnormal sleep 5-6 hrs per night    Problem list:  Hyperlipidemia  6/3/2024: LDL 96  Palpitations  MCT 2022: Sinus rhythm, rare PACs/PVCs  MR  Echo 5/15/2024: EF 63%, mild MR, mild TR  Chest pain  Cath 2018: Angina, abnormal stress test EF 65%, normal coronaries  SPECT 2018: Sum stress score 7, sum difference score 3, EF 72%, evidence of ischemia small in size, mild in severity, found in lateral region  CT coronary calcium score 2024: 1  Chronic venous insufficiency  Venous duplex 5/15/2024: Bilateral GSV's remain occluded following remote cath occlusion      Current Outpatient Medications   Medication Instructions    atenolol (TENORMIN) 12.5 mg, Daily    buprenorphine-naloxone (SUBOXONE) 8-2 MG film film Use one film every other day.  Alternate with 1/2 of the 12 mg    cyanocobalamin 1000 MCG/ML injection INJECT 1ML EVERY MONTH    cycloSPORINE (RESTASIS) 0.05 % ophthalmic emulsion 1 drop, Every 12 Hours    ibuprofen (ADVIL,MOTRIN) 600 mg, Every 6 Hours PRN    levothyroxine (SYNTHROID, LEVOTHROID) 150 mcg, Every Early Morning    Linzess 145 mcg, Every Morning Before Breakfast    metoclopramide (REGLAN) 5 mg, Daily    omeprazole (PRILOSEC) 40 mg, Daily    ondansetron (ZOFRAN) 8 mg, Every 8 Hours PRN    ondansetron ODT (ZOFRAN-ODT) 4 mg, Every 8 Hours PRN    rosuvastatin (CRESTOR) 20 mg,  "Daily    vitamin D (ERGOCALCIFEROL) 50,000 Units, Every 7 Days                     Objective     Vital Signs:  /80 (BP Location: Right arm, Patient Position: Sitting, Cuff Size: Adult)   Pulse 84   Ht 165.1 cm (65\")   Wt 86.6 kg (191 lb)   BMI 31.78 kg/m²   Estimated body mass index is 31.78 kg/m² as calculated from the following:    Height as of this encounter: 165.1 cm (65\").    Weight as of this encounter: 86.6 kg (191 lb).       Cardiovascular:      PMI at left midclavicular line. Normal rate. Regular rhythm. Normal S1. Normal S2.       Murmurs: There is no murmur.      No gallop.  No click. No rub.   Pulses:     Intact distal pulses.   Edema:     Peripheral edema absent.             Assessment and Plan     Assessment & Plan  Palpitations         Hyperlipidemia LDL goal <100   Needs labs    Orders:    Hepatic Function Panel; Future    High Sensitivity CRP; Future    Lipoprotein A (LPA); Future    Lipid Panel; Future    Chronic venous insufficiency of lower extremity  S/P vein ablation  Stable.  30 mm/hg knee high compression stockings.  Leg elevation x 30 minutes 3 times daily.  Increase your  walking schedule and distance covered.  Weight loss diet.         Chronic fatigue  ?Statins  ?Insomnia  Hold ststins  Re evaluate 4 weeks.         Follow Up  No follow-ups on file.    [facility]  "

## 2025-05-06 ENCOUNTER — LAB (OUTPATIENT)
Facility: HOSPITAL | Age: 45
End: 2025-05-06
Payer: COMMERCIAL

## 2025-05-06 ENCOUNTER — OFFICE VISIT (OUTPATIENT)
Age: 45
End: 2025-05-06
Payer: COMMERCIAL

## 2025-05-06 ENCOUNTER — PATIENT ROUNDING (BHMG ONLY) (OUTPATIENT)
Age: 45
End: 2025-05-06

## 2025-05-06 VITALS
HEART RATE: 84 BPM | DIASTOLIC BLOOD PRESSURE: 80 MMHG | WEIGHT: 191 LBS | SYSTOLIC BLOOD PRESSURE: 132 MMHG | HEIGHT: 65 IN | BODY MASS INDEX: 31.82 KG/M2

## 2025-05-06 DIAGNOSIS — E78.5 HYPERLIPIDEMIA LDL GOAL <100: ICD-10-CM

## 2025-05-06 DIAGNOSIS — I87.2 CHRONIC VENOUS INSUFFICIENCY OF LOWER EXTREMITY: ICD-10-CM

## 2025-05-06 DIAGNOSIS — R00.2 PALPITATIONS: Primary | ICD-10-CM

## 2025-05-06 DIAGNOSIS — R53.82 CHRONIC FATIGUE: ICD-10-CM

## 2025-05-06 PROCEDURE — 36415 COLL VENOUS BLD VENIPUNCTURE: CPT

## 2025-05-06 PROCEDURE — 86141 C-REACTIVE PROTEIN HS: CPT

## 2025-05-06 PROCEDURE — 83695 ASSAY OF LIPOPROTEIN(A): CPT

## 2025-05-06 PROCEDURE — 80076 HEPATIC FUNCTION PANEL: CPT

## 2025-05-06 PROCEDURE — 80061 LIPID PANEL: CPT

## 2025-05-06 RX ORDER — ONDANSETRON 4 MG/1
4 TABLET, ORALLY DISINTEGRATING ORAL EVERY 8 HOURS PRN
COMMUNITY
Start: 2025-03-15

## 2025-05-06 RX ORDER — METOCLOPRAMIDE 5 MG/1
5 TABLET ORAL DAILY
COMMUNITY

## 2025-05-06 RX ORDER — ROSUVASTATIN CALCIUM 20 MG/1
20 TABLET, COATED ORAL DAILY
COMMUNITY

## 2025-05-06 RX ORDER — LEVOTHYROXINE SODIUM 150 UG/1
150 TABLET ORAL
COMMUNITY

## 2025-05-06 RX ORDER — CYCLOSPORINE 0.5 MG/ML
1 EMULSION OPHTHALMIC EVERY 12 HOURS
COMMUNITY
Start: 2025-04-28

## 2025-05-06 RX ORDER — LINACLOTIDE 145 UG/1
145 CAPSULE, GELATIN COATED ORAL
COMMUNITY

## 2025-05-06 NOTE — PROGRESS NOTES
....My name is Geri Toscano, and I am the Practice Manager for [facility].    I would like to thank you for being a loyal patient. If you do not mind, I would like to ask you some questions about your recent visit with us. Please feel free to reply if you wish to provide us with feedback on your visit with our practice.    First, could you tell me what went well with your recent visit?    Secondly, we are always looking for ways to make our patients' experiences even better. Do you have any recommendations on what we can do to improve your experience?    Finally, overall were you satisfied with your visit with us as a [facility] facility?    Over the next few days, you will be receiving a Patient Experience Survey. Please consider taking the survey, as it helps [employer] in improving their patient care.    Thank you for taking the time to answer our questions today.    I hope you have a good day..

## 2025-05-07 LAB
ALBUMIN SERPL-MCNC: 4.5 G/DL (ref 3.5–5.2)
ALP SERPL-CCNC: 69 U/L (ref 39–117)
ALT SERPL W P-5'-P-CCNC: 19 U/L (ref 1–33)
AST SERPL-CCNC: 25 U/L (ref 1–32)
BILIRUB CONJ SERPL-MCNC: <0.1 MG/DL (ref 0–0.3)
BILIRUB INDIRECT SERPL-MCNC: NORMAL MG/DL
BILIRUB SERPL-MCNC: 0.2 MG/DL (ref 0–1.2)
CHOLEST SERPL-MCNC: 214 MG/DL (ref 0–200)
CRP SERPL-MCNC: 0.19 MG/DL (ref 0.01–0.5)
HDLC SERPL-MCNC: 54 MG/DL (ref 40–60)
LDLC SERPL CALC-MCNC: 149 MG/DL (ref 0–100)
LDLC/HDLC SERPL: 2.74 {RATIO}
PROT SERPL-MCNC: 7.8 G/DL (ref 6–8.5)
TRIGL SERPL-MCNC: 61 MG/DL (ref 0–150)
VLDLC SERPL-MCNC: 11 MG/DL (ref 5–40)

## 2025-05-08 LAB — LPA SERPL-SCNC: 216.5 NMOL/L

## 2025-06-02 NOTE — PROGRESS NOTES
Cardiology Patient Note     Name: Adina Thomas  :   1980  PCP: Mickey Park MD  Date:   2025  Department: [facility] CARDIOLOGY  [address]  [facility] [address]  Fax [phone]  Phone [phone]    Chief Complaint   Patient presents with    Palpitations    Hyperlipidemia     Problem list:  Hyperlipidemia  6/3/2024: LDL 96  2025 lipoprotein a 216, , CRP normal  Palpitations  MCT 2022 Sinus rhythm, rare PACs/PVCs  MR  Echo 5/15/2024: EF 63%, mild MR, mild TR  Echo 3/28/2020 EF 55 to 60%, trace MR, mild TR  Chest pain  Cath 2018: Angina, abnormal stress test EF 65%, normal coronaries  SPECT 2018: Sum stress score 7, sum difference score 3, EF 72%, evidence of ischemia small in size, mild in severity, found in lateral region  CT coronary calcium score 2024: 1  Chronic venous insufficiency  Venous duplex 5/15/2024: Bilateral GSV's remain occluded following remote cath occlusion  6.   Chronic fatigue    Subjective     History of Present Illness  Adina Thomas is a 44 y.o. female who presents today for 4 week follow up. Patient continues to report increased fatigue with minimal exertion as well as frequent episodes of palpitations/heart racing which are associated with dizziness. At last visit, Dr Esparza recommended her to hold her statin to see if that had any effect on fatigue.  She reports she did not notice any change being off medication.  She reports she works out every night and denies any chest pressure with this.  Blood pressure well-controlled at home per patient.    Past Medical History:   Diagnosis Date    Abnormal ECG     Abnormal Pap smear of cervix     Anemia     Anginal chest pain at rest     Anxiety     Arthritis     Chronic venous insufficiency     Depression     Fatigue     Gastroesophageal reflux     Headache     Hyperlipidemia     Hypotension     Hypothyroidism      Irregular heart rhythm     Kidney stone     Limb pain     Limb swelling     Migraine     Mitral regurgitation     MILD BY ECHO    NSVT (nonsustained ventricular tachycardia)     Osteoarthritis     Palpitations     PMS (premenstrual syndrome)     Shortness of breath     Substance abuse     Tricuspid regurgitation     MILD  BY ECHO    Urinary tract infection     Vertigo     /LIGHTHEADED      Past Surgical History:   Procedure Laterality Date    CARDIAC CATHETERIZATION  09/20/2018    SJE/ERES/PI/HX:ANGINA PECTORIS,CCS CLASS III/EF IS 65%-70%. LEFT MAIN CORONARY ARTERY NO SIGNIFICANT DISEASE. LEFT ANTERIOR DESCENDING CORONARY ARTERY THE DIAGONALS ARE FREE FROM DISEASE LCCA WITHOUT SIGNIFICANT DISEASE    CHOLECYSTECTOMY      DILATATION AND CURETTAGE      LAPAROSCOPIC CHOLECYSTECTOMY      MOUTH SURGERY      THYROIDECTOMY       Family History   Problem Relation Age of Onset    Bipolar disorder Mother     Depression Mother     Hyperlipidemia Mother     Bipolar disorder Father     Coronary artery disease Maternal Grandmother     Osteoporosis Maternal Grandmother     Hyperlipidemia Maternal Grandmother     Ovarian cancer Maternal Great-Grandmother     Diabetes Other     Hyperlipidemia Other      Social History     Socioeconomic History    Marital status:    Tobacco Use    Smoking status: Former     Types: Cigarettes    Smokeless tobacco: Never   Vaping Use    Vaping status: Every Day    Substances: Nicotine    Devices: Disposable   Substance and Sexual Activity    Alcohol use: No    Drug use: Yes    Sexual activity: Yes     Partners: Male     Birth control/protection: I.U.D.     Allergies   Allergen Reactions    Codeine GI Intolerance    Demerol [Meperidine] Unknown - Low Severity     Not an allergic rxn, but caused hypotension at very small dose    Morphine And Codeine Rash    Nuts Angioedema     Tongue swelling w/ pecan    Prednisone Rash and Unknown (See Comments)     unknown       Current Outpatient Medications:     " atenolol (TENORMIN) 25 MG tablet, Take 0.5 tablets by mouth Daily., Disp: , Rfl:     buprenorphine-naloxone (SUBOXONE) 8-2 MG film film, Use one film every other day.  Alternate with 1/2 of the 12 mg, Disp: 15 each, Rfl: 0    cyanocobalamin 1000 MCG/ML injection, INJECT 1ML EVERY MONTH, Disp: , Rfl: 2    cycloSPORINE (RESTASIS) 0.05 % ophthalmic emulsion, Administer 1 drop to both eyes Every 12 (Twelve) Hours., Disp: , Rfl:     ibuprofen (ADVIL,MOTRIN) 600 MG tablet, Take 1 tablet by mouth Every 6 (Six) Hours As Needed for Mild Pain., Disp: , Rfl:     levothyroxine (SYNTHROID, LEVOTHROID) 150 MCG tablet, Take 1 tablet by mouth Every Morning., Disp: , Rfl:     Linzess 145 MCG capsule capsule, Take 1 capsule by mouth Every Morning Before Breakfast., Disp: , Rfl:     metoclopramide (REGLAN) 5 MG tablet, Take 1 tablet by mouth Daily., Disp: , Rfl:     omeprazole (PriLOSEC) 40 MG capsule, Take 1 capsule by mouth Daily., Disp: , Rfl:     ondansetron (ZOFRAN) 8 MG tablet, Take 1 tablet by mouth Every 8 (Eight) Hours As Needed., Disp: , Rfl:     ondansetron ODT (ZOFRAN-ODT) 4 MG disintegrating tablet, Take 1 tablet by mouth Every 8 (Eight) Hours As Needed., Disp: , Rfl:     rosuvastatin (CRESTOR) 20 MG tablet, Take 1 tablet by mouth Daily., Disp: , Rfl:     vitamin D (ERGOCALCIFEROL) 49673 units capsule capsule, Take 1 capsule by mouth Every 7 (Seven) Days., Disp: , Rfl:     Objective     Vital Signs:  /76 (BP Location: Right arm, Patient Position: Sitting)   Pulse 77   Ht 165 cm (64.96\")   Wt 86.6 kg (191 lb)   BMI 31.82 kg/m²   Estimated body mass index is 31.82 kg/m² as calculated from the following:    Height as of this encounter: 165 cm (64.96\").    Weight as of this encounter: 86.6 kg (191 lb).       Physical Exam  Vitals reviewed.   Constitutional:       Appearance: Normal appearance.   Eyes:      Extraocular Movements: Extraocular movements intact.      Pupils: Pupils are equal, round, and reactive to " light.   Cardiovascular:      Rate and Rhythm: Normal rate and regular rhythm.   Pulmonary:      Effort: Pulmonary effort is normal.      Breath sounds: Normal breath sounds.   Abdominal:      General: Bowel sounds are normal.      Palpations: Abdomen is soft.   Musculoskeletal:         General: Normal range of motion.      Cervical back: Normal range of motion and neck supple.   Skin:     General: Skin is warm and dry.      Capillary Refill: Capillary refill takes less than 2 seconds.   Neurological:      General: No focal deficit present.      Mental Status: She is alert and oriented to person, place, and time.   Psychiatric:         Mood and Affect: Mood normal.         Behavior: Behavior normal.           Lab Results   Component Value Date    GLUCOSE 116 (H) 06/01/2015    BUN 10 06/01/2015    CREATININE 0.77 06/01/2015    BCR 13.6 06/01/2015    K 4.0 06/02/2015    CO2 34.0 (H) 06/01/2015    CALCIUM 9.3 06/01/2015    ALBUMIN 4.5 05/06/2025    AST 25 05/06/2025    ALT 19 05/06/2025     Lab Results   Component Value Date    CHOL 214 (H) 05/06/2025    TRIG 61 05/06/2025    HDL 54 05/06/2025     (H) 05/06/2025      Lab Results   Component Value Date    WBC 7.3 06/01/2015    RBC 4.30 06/01/2015    HGB 13.0 06/01/2015    HCT 39.3 06/01/2015    MCV 91.4 06/01/2015     06/01/2015     Lab Results   Component Value Date    TSH 29.414 (H) 01/05/2015     Assessment and Plan     Assessment & Plan  Palpitations  Patient continues to report increased palpitations and heart fluttering accompanied by dizziness  Continue atenolol 25 mg half tablet daily  Will place patient on Holter monitor to assess for any arrhythmia & reassess in 4 weeks    Orders:    Holter Monitor - 72 Hour Up To 15 Days; Future    Chronic fatigue  Patient continues to report increased fatigue with minimal exertion  Will place patient on Holter monitor to assess for any arrhythmia & reassess in 4 weeks    Orders:    Holter Monitor - 72 Hour Up  To 15 Days; Future    Hyperlipidemia LDL goal <100  Not within guidelines  Restart rosuvastatin 20 mg daily  Repeat full lipid panel in 3 months, titrate statin as needed for goal LDL less than 100       Nonrheumatic mitral valve regurgitation  Echo 5/15/2024: EF 63%, mild MR, mild TR  Repeat echocardiogram x 3 years       Follow Up  Return in about 4 weeks (around 7/1/2025).    Keena Browne, APRN

## 2025-06-03 ENCOUNTER — OFFICE VISIT (OUTPATIENT)
Age: 45
End: 2025-06-03
Payer: COMMERCIAL

## 2025-06-03 VITALS
DIASTOLIC BLOOD PRESSURE: 76 MMHG | BODY MASS INDEX: 31.82 KG/M2 | HEIGHT: 65 IN | HEART RATE: 77 BPM | WEIGHT: 191 LBS | SYSTOLIC BLOOD PRESSURE: 137 MMHG

## 2025-06-03 DIAGNOSIS — E78.5 HYPERLIPIDEMIA LDL GOAL <100: ICD-10-CM

## 2025-06-03 DIAGNOSIS — I34.0 NONRHEUMATIC MITRAL VALVE REGURGITATION: ICD-10-CM

## 2025-06-03 DIAGNOSIS — R00.2 PALPITATIONS: Primary | ICD-10-CM

## 2025-06-03 DIAGNOSIS — R53.82 CHRONIC FATIGUE: ICD-10-CM

## 2025-06-03 PROCEDURE — 99214 OFFICE O/P EST MOD 30 MIN: CPT

## 2025-06-03 NOTE — ASSESSMENT & PLAN NOTE
Patient continues to report increased palpitations and heart fluttering accompanied by dizziness  Continue atenolol 25 mg half tablet daily  Will place patient on Holter monitor to assess for any arrhythmia & reassess in 4 weeks    Orders:    Holter Monitor - 72 Hour Up To 15 Days; Future

## 2025-06-03 NOTE — ASSESSMENT & PLAN NOTE
Not within guidelines  Restart rosuvastatin 20 mg daily  Repeat full lipid panel in 3 months, titrate statin as needed for goal LDL less than 100

## 2025-06-03 NOTE — ASSESSMENT & PLAN NOTE
Patient continues to report increased fatigue with minimal exertion  Will place patient on Holter monitor to assess for any arrhythmia & reassess in 4 weeks    Orders:    Holter Monitor - 72 Hour Up To 15 Days; Future

## 2025-07-02 NOTE — PROGRESS NOTES
Cardiology Patient Note     Name: Adina Thomas  :   1980  PCP: Mickey Park MD  Date:   2025  Department: [facility] CARDIOLOGY  [address]  [facility] [address]  Fax [phone]  Phone [phone]    Chief Complaint   Patient presents with    Results    Chest Pain    Leg Swelling     Problem list:  Hyperlipidemia  6/3/2024: LDL 96  2025 lipoprotein a 216, , CRP normal  Palpitations  MCT 2022 Sinus rhythm, rare PACs/PVCs  Holter monitor 2025 sinus rhythm with rare PAC/PVC, no arrhythmia  MR  Echo 5/15/2024: EF 63%, mild MR, mild TR  Echo 3/28/2020 EF 55 to 60%, trace MR, mild TR  Chest pain  Cath 2018: Angina, abnormal stress test EF 65%, normal coronaries  SPECT 2018: Sum stress score 7, sum difference score 3, EF 72%, evidence of ischemia small in size, mild in severity, found in lateral region  CT coronary calcium score 2024: 1  Chronic venous insufficiency  Venous duplex 5/15/2024: Bilateral GSV's remain occluded following remote cath occlusion  6.   Chronic fatigue    Subjective     History of Present Illness  Adina Thomas is a 45 y.o. female who presents today for follow-up post Holter monitor.  Holter monitor revealed sinus rhythm with rare PACs/PVCs.  Maximum heart rate was 137 & average heart rate of 83.  Patient continues to report episodes of her heart racing and chest tightness.  Reports she has had multiple episodes of chest tightness/throbbing that radiates to her left arm most noticeable while working out at the gym.  Reports these episodes resolve with rest.  She also has noticed increased leg swelling recently.    Past Medical History:   Diagnosis Date    Abnormal ECG     Abnormal Pap smear of cervix     Anemia     Anginal chest pain at rest     Anxiety     Arthritis     Chronic venous insufficiency     Depression     Fatigue     Gastroesophageal reflux     Headache      Hyperlipidemia     Hypotension     Hypothyroidism     Irregular heart rhythm     Kidney stone     Limb pain     Limb swelling     Migraine     Mitral regurgitation     MILD BY ECHO    NSVT (nonsustained ventricular tachycardia)     Osteoarthritis     Palpitations     PMS (premenstrual syndrome)     Shortness of breath     Substance abuse     Tricuspid regurgitation     MILD  BY ECHO    Urinary tract infection     Vertigo     /LIGHTHEADED      Past Surgical History:   Procedure Laterality Date    CARDIAC CATHETERIZATION  09/20/2018    SJE/ERES/PI/HX:ANGINA PECTORIS,CCS CLASS III/EF IS 65%-70%. LEFT MAIN CORONARY ARTERY NO SIGNIFICANT DISEASE. LEFT ANTERIOR DESCENDING CORONARY ARTERY THE DIAGONALS ARE FREE FROM DISEASE LCCA WITHOUT SIGNIFICANT DISEASE    CHOLECYSTECTOMY      DILATATION AND CURETTAGE      LAPAROSCOPIC CHOLECYSTECTOMY      MOUTH SURGERY      THYROIDECTOMY       Family History   Problem Relation Age of Onset    Bipolar disorder Mother     Depression Mother     Hyperlipidemia Mother     Bipolar disorder Father     Coronary artery disease Maternal Grandmother     Osteoporosis Maternal Grandmother     Hyperlipidemia Maternal Grandmother     Ovarian cancer Maternal Great-Grandmother     Diabetes Other     Hyperlipidemia Other      Social History     Socioeconomic History    Marital status:    Tobacco Use    Smoking status: Former     Types: Cigarettes     Passive exposure: Never    Smokeless tobacco: Never   Vaping Use    Vaping status: Every Day    Substances: Nicotine    Devices: Disposable   Substance and Sexual Activity    Alcohol use: No    Drug use: Yes    Sexual activity: Yes     Partners: Male     Birth control/protection: I.U.D.     Allergies   Allergen Reactions    Codeine GI Intolerance    Demerol [Meperidine] Unknown - Low Severity     Not an allergic rxn, but caused hypotension at very small dose    Morphine And Codeine Rash    Nuts Angioedema     Tongue swelling w/ pecan     "Prednisone Rash and Unknown (See Comments)     unknown       Current Outpatient Medications:     atenolol (TENORMIN) 25 MG tablet, Take 0.5 tablets by mouth Daily. (Patient taking differently: Take  by mouth Daily. 1/4 tab qd), Disp: , Rfl:     buprenorphine-naloxone (SUBOXONE) 8-2 MG film film, Use one film every other day.  Alternate with 1/2 of the 12 mg (Patient taking differently: Three times a day), Disp: 15 each, Rfl: 0    ibuprofen (ADVIL,MOTRIN) 600 MG tablet, Take 1 tablet by mouth Every 6 (Six) Hours As Needed for Mild Pain., Disp: , Rfl:     levothyroxine (SYNTHROID, LEVOTHROID) 150 MCG tablet, Take 1 tablet by mouth Every Morning., Disp: , Rfl:     Linzess 145 MCG capsule capsule, Take 1 capsule by mouth Every Morning Before Breakfast., Disp: , Rfl:     metoclopramide (REGLAN) 5 MG tablet, Take 1 tablet by mouth Daily., Disp: , Rfl:     omeprazole (PriLOSEC) 40 MG capsule, Take 1 capsule by mouth Daily., Disp: , Rfl:     ondansetron (ZOFRAN) 8 MG tablet, Take 1 tablet by mouth Every 8 (Eight) Hours As Needed., Disp: , Rfl:     ondansetron ODT (ZOFRAN-ODT) 4 MG disintegrating tablet, Take 1 tablet by mouth Every 8 (Eight) Hours As Needed., Disp: , Rfl:     rosuvastatin (CRESTOR) 20 MG tablet, Take 1 tablet by mouth Daily., Disp: , Rfl:     vitamin D (ERGOCALCIFEROL) 35072 units capsule capsule, Take 1 capsule by mouth Every 7 (Seven) Days., Disp: , Rfl:     cyanocobalamin 1000 MCG/ML injection, INJECT 1ML EVERY MONTH (Patient not taking: Reported on 7/3/2025), Disp: , Rfl: 2    cycloSPORINE (RESTASIS) 0.05 % ophthalmic emulsion, Administer 1 drop to both eyes Every 12 (Twelve) Hours. (Patient not taking: Reported on 7/3/2025), Disp: , Rfl:     Objective     Vital Signs:  /80 (BP Location: Left arm, Patient Position: Sitting, Cuff Size: Adult)   Pulse 89   Ht 165 cm (64.96\")   Wt 86.6 kg (191 lb)   SpO2 98%   BMI 31.82 kg/m²   Estimated body mass index is 31.82 kg/m² as calculated from the " "following:    Height as of this encounter: 165 cm (64.96\").    Weight as of this encounter: 86.6 kg (191 lb).         Physical Exam  Vitals reviewed.   Constitutional:       Appearance: Normal appearance.   Eyes:      Extraocular Movements: Extraocular movements intact.      Pupils: Pupils are equal, round, and reactive to light.   Cardiovascular:      Rate and Rhythm: Normal rate and regular rhythm.      Heart sounds: Murmur heard.   Pulmonary:      Effort: Pulmonary effort is normal.      Breath sounds: Normal breath sounds.   Abdominal:      General: Bowel sounds are normal.      Palpations: Abdomen is soft.   Musculoskeletal:         General: Normal range of motion.      Cervical back: Normal range of motion and neck supple.   Skin:     General: Skin is warm and dry.      Capillary Refill: Capillary refill takes less than 2 seconds.   Neurological:      General: No focal deficit present.      Mental Status: She is alert and oriented to person, place, and time.   Psychiatric:         Mood and Affect: Mood normal.         Behavior: Behavior normal.           Lab Results   Component Value Date    GLUCOSE 116 (H) 06/01/2015    BUN 10 06/01/2015    CREATININE 0.77 06/01/2015    BCR 13.6 06/01/2015    K 4.0 06/02/2015    CO2 34.0 (H) 06/01/2015    CALCIUM 9.3 06/01/2015    ALBUMIN 4.5 05/06/2025    AST 25 05/06/2025    ALT 19 05/06/2025     Lab Results   Component Value Date    CHOL 214 (H) 05/06/2025    TRIG 61 05/06/2025    HDL 54 05/06/2025     (H) 05/06/2025      Lab Results   Component Value Date    WBC 7.3 06/01/2015    RBC 4.30 06/01/2015    HGB 13.0 06/01/2015    HCT 39.3 06/01/2015    MCV 91.4 06/01/2015     06/01/2015     Lab Results   Component Value Date    TSH 29.414 (H) 01/05/2015     Assessment and Plan     Assessment & Plan  Palpitations  Holter monitor reviewed in office today  Continue atenolol 25 mg half tablet daily    Orders:    Basic Metabolic Panel; Future    CBC & Differential; " Future    Hyperlipidemia LDL goal <100  Not within guidelines  Recently restarted rosuvastatin 20 mg daily  Repeat full lipid panel prior to follow-up appointment, titrate statin as needed for goal LDL less than 100     Orders:    Hepatic Function Panel; Future    High Sensitivity CRP; Future    Lipoprotein A (LPA); Future    Lipid Panel; Future    Nonrheumatic mitral valve regurgitation  Repeat 2D echocardiogram x 3 years       Chest pain, unspecified type  Patient reports chest tightness/pressure that radiates to her left arm while working out  We will repeat stress test to assess for any new cardiac ischemia  Advised patient if chest pain worsens, go to closest ER for evaluation    Orders:    Treadmill Stress Test; Future    Chronic venous insufficiency of lower extremity  Previous bilateral GSV ablation  Continues to report increased lower extremity edema  Previously prescribed Lasix however is cautious to take  Will repeat bilateral lower extremity venous duplex    Orders:    Venous w Reflux Lower Extremity - Bilateral CAR; Future    Follow Up  Return in about 4 weeks (around 7/31/2025).    Keena Browne, APRN

## 2025-07-02 NOTE — ASSESSMENT & PLAN NOTE
Holter monitor reviewed in office today  Continue atenolol 25 mg half tablet daily    Orders:    Basic Metabolic Panel; Future    CBC & Differential; Future

## 2025-07-02 NOTE — ASSESSMENT & PLAN NOTE
Not within guidelines  Recently restarted rosuvastatin 20 mg daily  Repeat full lipid panel prior to follow-up appointment, titrate statin as needed for goal LDL less than 100     Orders:    Hepatic Function Panel; Future    High Sensitivity CRP; Future    Lipoprotein A (LPA); Future    Lipid Panel; Future

## 2025-07-03 ENCOUNTER — OFFICE VISIT (OUTPATIENT)
Age: 45
End: 2025-07-03
Payer: COMMERCIAL

## 2025-07-03 VITALS
HEIGHT: 65 IN | SYSTOLIC BLOOD PRESSURE: 120 MMHG | DIASTOLIC BLOOD PRESSURE: 80 MMHG | OXYGEN SATURATION: 98 % | BODY MASS INDEX: 31.82 KG/M2 | HEART RATE: 89 BPM | WEIGHT: 191 LBS

## 2025-07-03 DIAGNOSIS — I87.2 CHRONIC VENOUS INSUFFICIENCY OF LOWER EXTREMITY: ICD-10-CM

## 2025-07-03 DIAGNOSIS — R07.9 CHEST PAIN, UNSPECIFIED TYPE: ICD-10-CM

## 2025-07-03 DIAGNOSIS — E78.5 HYPERLIPIDEMIA LDL GOAL <100: ICD-10-CM

## 2025-07-03 DIAGNOSIS — R00.2 PALPITATIONS: Primary | ICD-10-CM

## 2025-07-03 DIAGNOSIS — I34.0 NONRHEUMATIC MITRAL VALVE REGURGITATION: ICD-10-CM

## 2025-07-03 NOTE — ASSESSMENT & PLAN NOTE
Patient reports chest tightness/pressure that radiates to her left arm while working out  We will repeat stress test to assess for any new cardiac ischemia  Advised patient if chest pain worsens, go to closest ER for evaluation    Orders:    Treadmill Stress Test; Future

## 2025-07-03 NOTE — ASSESSMENT & PLAN NOTE
Previous bilateral GSV ablation  Continues to report increased lower extremity edema  Previously prescribed Lasix however is cautious to take  Will repeat bilateral lower extremity venous duplex    Orders:    Venous w Reflux Lower Extremity - Bilateral CAR; Future

## 2025-07-22 ENCOUNTER — TELEPHONE (OUTPATIENT)
Age: 45
End: 2025-07-22
Payer: COMMERCIAL

## 2025-07-22 NOTE — TELEPHONE ENCOUNTER
Pt called states she was having high blood pressure and she recently had a ct and xray done she states she has gastrocolitis so she thinks that's why her BP has been running high she states the scans showed  trace Pericardial Effusion but she has not had a follow up to discuss results yet she was advised to make her Cardiololgist aware she wants to know if she needs other testing for now or just discuss at next visit with Dr.Avichai Esparza. Please advise.

## 2025-07-23 NOTE — TELEPHONE ENCOUNTER
IF trace can just keep her apt with him on 8/1 and he can evaluate her and determine if further imaging is needed

## 2025-07-23 NOTE — TELEPHONE ENCOUNTER
Spoke with patient and advised her of Rosalina's instructions. Patient states that she will have to move her appts out. I advised patient if she becomes symptomatic to call the office and we will get her in for an ASAP appt.